# Patient Record
Sex: FEMALE | Race: BLACK OR AFRICAN AMERICAN | NOT HISPANIC OR LATINO | Employment: OTHER | URBAN - METROPOLITAN AREA
[De-identification: names, ages, dates, MRNs, and addresses within clinical notes are randomized per-mention and may not be internally consistent; named-entity substitution may affect disease eponyms.]

---

## 2023-01-25 PROBLEM — E78.5 HYPERLIPEMIA: Status: ACTIVE | Noted: 2023-01-25

## 2023-01-25 PROBLEM — F41.9 ANXIETY: Status: ACTIVE | Noted: 2023-01-25

## 2023-01-25 PROBLEM — M81.0 AGE RELATED OSTEOPOROSIS: Status: ACTIVE | Noted: 2023-01-25

## 2023-01-25 PROBLEM — K59.00 CONSTIPATION: Status: ACTIVE | Noted: 2023-01-25

## 2023-01-25 PROBLEM — I10 HYPERTENSION: Status: ACTIVE | Noted: 2023-01-25

## 2023-01-25 PROBLEM — J45.909 ASTHMA (HHS-HCC): Status: ACTIVE | Noted: 2023-01-25

## 2023-01-25 PROBLEM — M72.2 PLANTAR FASCIITIS: Status: ACTIVE | Noted: 2023-01-25

## 2023-01-25 PROBLEM — D50.9 MICROCYTIC ANEMIA: Status: ACTIVE | Noted: 2023-01-25

## 2023-01-25 PROBLEM — E87.1 HYPONATREMIA: Status: ACTIVE | Noted: 2023-01-25

## 2023-01-25 PROBLEM — J30.2 SEASONAL ALLERGIES: Status: ACTIVE | Noted: 2023-01-25

## 2023-01-25 PROBLEM — E79.0 ASYMPTOMATIC HYPERURICEMIA: Status: ACTIVE | Noted: 2023-01-25

## 2023-01-25 PROBLEM — J30.9 ALLERGIC RHINITIS: Status: ACTIVE | Noted: 2023-01-25

## 2023-01-25 PROBLEM — H40.9 GLAUCOMA: Status: ACTIVE | Noted: 2023-01-25

## 2023-01-25 PROBLEM — G25.0 BENIGN FAMILIAL TREMOR: Status: ACTIVE | Noted: 2023-01-25

## 2023-01-25 PROBLEM — R73.03 PRE-DIABETES: Status: ACTIVE | Noted: 2023-01-25

## 2023-01-25 PROBLEM — M17.12 PRIMARY OSTEOARTHRITIS OF LEFT KNEE: Status: ACTIVE | Noted: 2023-01-25

## 2023-01-25 PROBLEM — E55.9 VITAMIN D DEFICIENCY: Status: ACTIVE | Noted: 2023-01-25

## 2023-01-25 PROBLEM — K21.9 GERD (GASTROESOPHAGEAL REFLUX DISEASE): Status: ACTIVE | Noted: 2023-01-25

## 2023-01-25 RX ORDER — SODIUM CHLORIDE 1000 MG
TABLET, SOLUBLE MISCELLANEOUS
COMMUNITY
Start: 2022-07-15 | End: 2023-03-09 | Stop reason: SDUPTHER

## 2023-01-25 RX ORDER — PROPRANOLOL HYDROCHLORIDE 60 MG/1
CAPSULE, EXTENDED RELEASE ORAL
COMMUNITY
Start: 2022-07-15 | End: 2023-03-09 | Stop reason: SDUPTHER

## 2023-01-25 RX ORDER — PRIMIDONE 250 MG/1
TABLET ORAL
COMMUNITY
Start: 2015-06-09 | End: 2024-05-24 | Stop reason: SDUPTHER

## 2023-01-25 RX ORDER — TOPIRAMATE 50 MG/1
TABLET, FILM COATED ORAL
COMMUNITY
Start: 2017-08-24 | End: 2023-03-09 | Stop reason: SDUPTHER

## 2023-01-25 RX ORDER — FERROUS SULFATE 325(65) MG
TABLET ORAL
COMMUNITY
Start: 2018-02-06 | End: 2024-01-15 | Stop reason: WASHOUT

## 2023-01-25 RX ORDER — FOLIC ACID 1 MG/1
TABLET ORAL
COMMUNITY
End: 2023-12-14 | Stop reason: SDUPTHER

## 2023-01-25 RX ORDER — ALBUTEROL SULFATE 0.83 MG/ML
SOLUTION RESPIRATORY (INHALATION)
COMMUNITY
Start: 2019-11-20

## 2023-01-25 RX ORDER — LATANOPROST 50 UG/ML
SOLUTION/ DROPS OPHTHALMIC
COMMUNITY

## 2023-01-25 RX ORDER — VALSARTAN 320 MG/1
TABLET ORAL
COMMUNITY
Start: 2019-06-19 | End: 2023-03-09 | Stop reason: SDUPTHER

## 2023-01-25 RX ORDER — OMEPRAZOLE 40 MG/1
CAPSULE, DELAYED RELEASE ORAL
COMMUNITY
Start: 2016-02-19 | End: 2023-03-09 | Stop reason: SDUPTHER

## 2023-01-25 RX ORDER — NIFEDIPINE 90 MG/1
TABLET, EXTENDED RELEASE ORAL
COMMUNITY
Start: 2017-07-13 | End: 2023-03-09 | Stop reason: SDUPTHER

## 2023-01-25 RX ORDER — CALCIUM CARBONATE/VITAMIN D3 500-10/5ML
LIQUID (ML) ORAL
COMMUNITY
Start: 2016-11-16

## 2023-01-25 RX ORDER — FLUTICASONE PROPIONATE 110 UG/1
AEROSOL, METERED RESPIRATORY (INHALATION)
COMMUNITY
Start: 2012-01-09

## 2023-01-25 RX ORDER — PRAVASTATIN SODIUM 20 MG/1
TABLET ORAL
COMMUNITY
Start: 2022-03-13 | End: 2023-03-09 | Stop reason: SDUPTHER

## 2023-01-25 RX ORDER — ACETAMINOPHEN 500 MG
TABLET ORAL
COMMUNITY
Start: 2017-02-23

## 2023-03-09 ENCOUNTER — OFFICE VISIT (OUTPATIENT)
Dept: PRIMARY CARE | Facility: CLINIC | Age: 83
End: 2023-03-09
Payer: MEDICARE

## 2023-03-09 VITALS
SYSTOLIC BLOOD PRESSURE: 150 MMHG | OXYGEN SATURATION: 96 % | WEIGHT: 148 LBS | HEART RATE: 90 BPM | BODY MASS INDEX: 29.84 KG/M2 | HEIGHT: 59 IN | DIASTOLIC BLOOD PRESSURE: 77 MMHG

## 2023-03-09 DIAGNOSIS — R73.03 PRE-DIABETES: ICD-10-CM

## 2023-03-09 DIAGNOSIS — E78.00 PURE HYPERCHOLESTEROLEMIA: ICD-10-CM

## 2023-03-09 DIAGNOSIS — E87.1 HYPONATREMIA: ICD-10-CM

## 2023-03-09 DIAGNOSIS — M81.0 AGE-RELATED OSTEOPOROSIS WITHOUT CURRENT PATHOLOGICAL FRACTURE: ICD-10-CM

## 2023-03-09 DIAGNOSIS — I10 PRIMARY HYPERTENSION: ICD-10-CM

## 2023-03-09 DIAGNOSIS — G25.0 BENIGN FAMILIAL TREMOR: Primary | ICD-10-CM

## 2023-03-09 DIAGNOSIS — K21.9 GASTROESOPHAGEAL REFLUX DISEASE WITHOUT ESOPHAGITIS: ICD-10-CM

## 2023-03-09 DIAGNOSIS — E55.9 VITAMIN D DEFICIENCY: ICD-10-CM

## 2023-03-09 DIAGNOSIS — J45.20 MILD INTERMITTENT ASTHMA, UNSPECIFIED WHETHER COMPLICATED (HHS-HCC): ICD-10-CM

## 2023-03-09 PROCEDURE — 99214 OFFICE O/P EST MOD 30 MIN: CPT | Performed by: FAMILY MEDICINE

## 2023-03-09 PROCEDURE — 3077F SYST BP >= 140 MM HG: CPT | Performed by: FAMILY MEDICINE

## 2023-03-09 PROCEDURE — 1160F RVW MEDS BY RX/DR IN RCRD: CPT | Performed by: FAMILY MEDICINE

## 2023-03-09 PROCEDURE — 1036F TOBACCO NON-USER: CPT | Performed by: FAMILY MEDICINE

## 2023-03-09 PROCEDURE — 1159F MED LIST DOCD IN RCRD: CPT | Performed by: FAMILY MEDICINE

## 2023-03-09 PROCEDURE — 3078F DIAST BP <80 MM HG: CPT | Performed by: FAMILY MEDICINE

## 2023-03-09 RX ORDER — NIFEDIPINE 90 MG/1
90 TABLET, EXTENDED RELEASE ORAL DAILY
Qty: 90 TABLET | Refills: 1 | Status: SHIPPED | OUTPATIENT
Start: 2023-03-09 | End: 2023-06-07

## 2023-03-09 RX ORDER — PROPRANOLOL HYDROCHLORIDE 60 MG/1
60 CAPSULE, EXTENDED RELEASE ORAL DAILY
Qty: 90 CAPSULE | Refills: 1 | Status: SHIPPED | OUTPATIENT
Start: 2023-03-09 | End: 2023-05-23

## 2023-03-09 RX ORDER — OMEPRAZOLE 40 MG/1
40 CAPSULE, DELAYED RELEASE ORAL
Qty: 90 CAPSULE | Refills: 1 | Status: SHIPPED | OUTPATIENT
Start: 2023-03-09 | End: 2023-04-19

## 2023-03-09 RX ORDER — SODIUM CHLORIDE 1000 MG
500 TABLET, SOLUBLE MISCELLANEOUS DAILY
Qty: 45 TABLET | Refills: 3 | Status: SHIPPED | OUTPATIENT
Start: 2023-03-09 | End: 2024-01-10 | Stop reason: SDUPTHER

## 2023-03-09 RX ORDER — PRAVASTATIN SODIUM 20 MG/1
20 TABLET ORAL DAILY
Qty: 90 TABLET | Refills: 1 | Status: SHIPPED | OUTPATIENT
Start: 2023-03-09 | End: 2023-06-07

## 2023-03-09 RX ORDER — TOPIRAMATE 50 MG/1
50 TABLET, FILM COATED ORAL 2 TIMES DAILY
Qty: 180 TABLET | Refills: 1 | Status: SHIPPED | OUTPATIENT
Start: 2023-03-09 | End: 2024-01-10 | Stop reason: SDUPTHER

## 2023-03-09 RX ORDER — VALSARTAN 320 MG/1
320 TABLET ORAL DAILY
Qty: 90 TABLET | Refills: 1 | Status: SHIPPED | OUTPATIENT
Start: 2023-03-09 | End: 2023-04-19

## 2023-03-09 ASSESSMENT — ENCOUNTER SYMPTOMS
OCCASIONAL FEELINGS OF UNSTEADINESS: 0
DEPRESSION: 0
LOSS OF SENSATION IN FEET: 0

## 2023-03-09 ASSESSMENT — PATIENT HEALTH QUESTIONNAIRE - PHQ9
1. LITTLE INTEREST OR PLEASURE IN DOING THINGS: NOT AT ALL
SUM OF ALL RESPONSES TO PHQ9 QUESTIONS 1 AND 2: 0
2. FEELING DOWN, DEPRESSED OR HOPELESS: NOT AT ALL

## 2023-03-09 NOTE — PROGRESS NOTES
"Subjective   Patient ID: Nava Gutierrez is a 82 y.o. female who presents for a follow up for hypertension.    HPI   The patient's blood pressures are well- controlled on the current dose of nifedipine and valsartan. She is also taking iron supplements for iron deficiency anemia, omeprazole for GERD, Folic acid for her memory concerns, Primidone, propanolol and Topamax for benign familial tremors and sodium supplements for hyponatremia. The patient is taking these medications as prescribed and is tolerating it well. For her asthma she uses Flovent and albuterol as needed. She complains of significant back pain.    Review of Systems  Constitutional: No fever or chills  Cardiovascular: no chest pain, no palpitations and no syncope.   Respiratory: no cough, no shortness of breath during exertion and no shortness of breath at rest.   Gastrointestinal: no abdominal pain, no nausea and no vomiting.  Neuro: No Headache, no dizziness  MSK: + back pain.    Objective   /74 (BP Location: Left arm, Patient Position: Sitting, BP Cuff Size: Adult)   Pulse 90   Ht 1.499 m (4' 11\")   Wt 67.1 kg (148 lb)   SpO2 96%   BMI 29.89 kg/m²     Physical Exam  Constitutional: Alert and in no acute distress. Well developed, well nourished  Head and Face: Head and face: Normal.    Cardiovascular: Heart rate and rhythm were normal, normal S1 and S2. No peripheral edema.   Pulmonary: No respiratory distress. Clear bilateral breath sounds.  Musculoskeletal: Gait and station: Normal. Muscle strength/tone: Normal.   Skin: Normal skin color and pigmentation, normal skin turgor, and no rash.    Psychiatric: Judgment and insight: Intact. Mood and affect: Normal.    Assessment/Plan   Diagnoses and all orders for this visit:  Benign familial tremor  -     propranolol LA (Inderal LA) 60 mg 24 hr capsule; Take 1 capsule (60 mg) by mouth once daily.  -     topiramate (Topamax) 50 mg tablet; Take 1 tablet (50 mg) by mouth in the morning and 1 " tablet (50 mg) before bedtime.  -     CBC; Future  Mild intermittent asthma, unspecified whether complicated  Primary hypertension  -     NIFEdipine XL (Procardia XL) 90 mg 24 hr tablet; Take 1 tablet (90 mg) by mouth once daily. Do not crush, chew, or split.  -     valsartan (Diovan) 320 mg tablet; Take 1 tablet (320 mg) by mouth once daily.  -     Comprehensive Metabolic Panel; Future  -     Follow Up In [facility] - PCP; Future  Gastroesophageal reflux disease without esophagitis  -     omeprazole (PriLOSEC) 40 mg DR capsule; Take 1 capsule (40 mg) by mouth once daily in the morning. Take before meals.  Age-related osteoporosis without current pathological fracture  Pure hypercholesterolemia  -     pravastatin (Pravachol) 20 mg tablet; Take 1 tablet (20 mg) by mouth once daily.  -     Lipid Panel; Future  -     Vitamin D, Total; Future  Hyponatremia  -     sodium chloride 1,000 mg tablet; Take 0.5 tablets (0.5 g) by mouth once daily.  Pre-diabetes  -     Hemoglobin A1C; Future  -     TSH with reflex to Free T4 if abnormal; Future  -     Vitamin B12; Future  Vitamin D deficiency  -     Vitamin D, Total; Future        Dear Nava Gutierrez     It was my pleasure to take care of you today in the office. Below are the things we discussed today:    1. Hypertension: Continue Nifedipine and Valsartan.    2. Hyperlipidemia: Continue Pravastatin.    3. GERD: Continue omeprazole.    4. Hyponatremia: Continue Sodium supplements.    5. Asthma: Continue Flovent and use albuterol as needed.    6. Iron deficiency anemia: Continue Iron supplements.     7. Benign familial tremor: Continue Topamax, Primidone and Propanolol.     8. Memory concerns: Continue Folic acid.    Your yearly Physical is due in: July 2023  When you call the office for your yearly Physical, please ask them to inform me to order your blood work, so that you can get the fasting blood work before your appointment and we can discuss the results at your  physical.      Please call me if any questions arise from now until your next visit. I will call you after I am done seeing patients. A Doctor is always available by phone when the office is closed. Please feel free to call for help with any problem that you feel shouldn't wait until the office re-opens.     Scribe Attestation  By signing my name below, I, Jaye Mahan , Edy   attest that this documentation has been prepared under the direction and in the presence of Terra Gray MD.

## 2023-04-18 DIAGNOSIS — K21.9 GASTROESOPHAGEAL REFLUX DISEASE WITHOUT ESOPHAGITIS: ICD-10-CM

## 2023-04-18 DIAGNOSIS — I10 PRIMARY HYPERTENSION: ICD-10-CM

## 2023-04-19 RX ORDER — VALSARTAN 320 MG/1
TABLET ORAL
Qty: 100 TABLET | Refills: 0 | Status: SHIPPED | OUTPATIENT
Start: 2023-04-19 | End: 2023-07-10 | Stop reason: SDUPTHER

## 2023-04-19 RX ORDER — OMEPRAZOLE 40 MG/1
CAPSULE, DELAYED RELEASE ORAL
Qty: 100 CAPSULE | Refills: 0 | Status: SHIPPED | OUTPATIENT
Start: 2023-04-19 | End: 2023-07-10 | Stop reason: SDUPTHER

## 2023-05-23 DIAGNOSIS — G25.0 BENIGN FAMILIAL TREMOR: ICD-10-CM

## 2023-05-23 RX ORDER — PROPRANOLOL HYDROCHLORIDE 60 MG/1
60 CAPSULE, EXTENDED RELEASE ORAL DAILY
Qty: 100 CAPSULE | Refills: 0 | Status: SHIPPED | OUTPATIENT
Start: 2023-05-23 | End: 2023-07-10 | Stop reason: SDUPTHER

## 2023-06-07 DIAGNOSIS — E78.00 PURE HYPERCHOLESTEROLEMIA: ICD-10-CM

## 2023-06-07 DIAGNOSIS — I10 PRIMARY HYPERTENSION: ICD-10-CM

## 2023-06-07 RX ORDER — NIFEDIPINE 90 MG/1
TABLET, EXTENDED RELEASE ORAL
Qty: 100 TABLET | Refills: 0 | Status: SHIPPED | OUTPATIENT
Start: 2023-06-07 | End: 2023-07-10 | Stop reason: SDUPTHER

## 2023-06-07 RX ORDER — PRAVASTATIN SODIUM 20 MG/1
TABLET ORAL
Qty: 100 TABLET | Refills: 0 | Status: SHIPPED | OUTPATIENT
Start: 2023-06-07 | End: 2023-07-10 | Stop reason: SDUPTHER

## 2023-06-09 ENCOUNTER — LAB (OUTPATIENT)
Dept: LAB | Facility: LAB | Age: 83
End: 2023-06-09
Payer: MEDICARE

## 2023-06-09 DIAGNOSIS — I10 PRIMARY HYPERTENSION: ICD-10-CM

## 2023-06-09 DIAGNOSIS — E55.9 VITAMIN D DEFICIENCY: ICD-10-CM

## 2023-06-09 DIAGNOSIS — E78.00 PURE HYPERCHOLESTEROLEMIA: ICD-10-CM

## 2023-06-09 DIAGNOSIS — G25.0 BENIGN FAMILIAL TREMOR: ICD-10-CM

## 2023-06-09 DIAGNOSIS — R73.03 PRE-DIABETES: ICD-10-CM

## 2023-06-09 LAB
ALANINE AMINOTRANSFERASE (SGPT) (U/L) IN SER/PLAS: 10 U/L (ref 7–45)
ALBUMIN (G/DL) IN SER/PLAS: 4.4 G/DL (ref 3.4–5)
ALKALINE PHOSPHATASE (U/L) IN SER/PLAS: 89 U/L (ref 33–136)
ANION GAP IN SER/PLAS: 12 MMOL/L (ref 10–20)
ASPARTATE AMINOTRANSFERASE (SGOT) (U/L) IN SER/PLAS: 15 U/L (ref 9–39)
BILIRUBIN TOTAL (MG/DL) IN SER/PLAS: 0.2 MG/DL (ref 0–1.2)
CALCIDIOL (25 OH VITAMIN D3) (NG/ML) IN SER/PLAS: 36 NG/ML
CALCIUM (MG/DL) IN SER/PLAS: 9.6 MG/DL (ref 8.6–10.3)
CARBON DIOXIDE, TOTAL (MMOL/L) IN SER/PLAS: 29 MMOL/L (ref 21–32)
CHLORIDE (MMOL/L) IN SER/PLAS: 104 MMOL/L (ref 98–107)
COBALAMIN (VITAMIN B12) (PG/ML) IN SER/PLAS: 527 PG/ML (ref 211–911)
CREATININE (MG/DL) IN SER/PLAS: 0.87 MG/DL (ref 0.5–1.05)
ERYTHROCYTE DISTRIBUTION WIDTH (RATIO) BY AUTOMATED COUNT: 16.4 % (ref 11.5–14.5)
ERYTHROCYTE MEAN CORPUSCULAR HEMOGLOBIN CONCENTRATION (G/DL) BY AUTOMATED: 29.2 G/DL (ref 32–36)
ERYTHROCYTE MEAN CORPUSCULAR VOLUME (FL) BY AUTOMATED COUNT: 70 FL (ref 80–100)
ERYTHROCYTES (10*6/UL) IN BLOOD BY AUTOMATED COUNT: 4.62 X10E12/L (ref 4–5.2)
ESTIMATED AVERAGE GLUCOSE FOR HBA1C: 123 MG/DL
GFR FEMALE: 67 ML/MIN/1.73M2
GLUCOSE (MG/DL) IN SER/PLAS: 103 MG/DL (ref 74–99)
HEMATOCRIT (%) IN BLOOD BY AUTOMATED COUNT: 32.2 % (ref 36–46)
HEMOGLOBIN (G/DL) IN BLOOD: 9.4 G/DL (ref 12–16)
HEMOGLOBIN A1C/HEMOGLOBIN TOTAL IN BLOOD: 5.9 %
LEUKOCYTES (10*3/UL) IN BLOOD BY AUTOMATED COUNT: 5.9 X10E9/L (ref 4.4–11.3)
PLATELETS (10*3/UL) IN BLOOD AUTOMATED COUNT: 254 X10E9/L (ref 150–450)
POTASSIUM (MMOL/L) IN SER/PLAS: 4.9 MMOL/L (ref 3.5–5.3)
PROTEIN TOTAL: 7.6 G/DL (ref 6.4–8.2)
SODIUM (MMOL/L) IN SER/PLAS: 140 MMOL/L (ref 136–145)
THYROTROPIN (MIU/L) IN SER/PLAS BY DETECTION LIMIT <= 0.05 MIU/L: 3.37 MIU/L (ref 0.44–3.98)
UREA NITROGEN (MG/DL) IN SER/PLAS: 21 MG/DL (ref 6–23)

## 2023-06-09 PROCEDURE — 36415 COLL VENOUS BLD VENIPUNCTURE: CPT

## 2023-06-09 PROCEDURE — 82306 VITAMIN D 25 HYDROXY: CPT

## 2023-06-09 PROCEDURE — 83036 HEMOGLOBIN GLYCOSYLATED A1C: CPT

## 2023-06-09 PROCEDURE — 80053 COMPREHEN METABOLIC PANEL: CPT

## 2023-06-09 PROCEDURE — 84443 ASSAY THYROID STIM HORMONE: CPT

## 2023-06-09 PROCEDURE — 82607 VITAMIN B-12: CPT

## 2023-06-09 PROCEDURE — 85027 COMPLETE CBC AUTOMATED: CPT

## 2023-06-15 LAB
CHOLESTEROL (MG/DL) IN SER/PLAS: 184 MG/DL (ref 0–199)
CHOLESTEROL IN HDL (MG/DL) IN SER/PLAS: 47.9 MG/DL
CHOLESTEROL/HDL RATIO: 3.8
LDL: 96 MG/DL (ref 0–99)
TRIGLYCERIDE (MG/DL) IN SER/PLAS: 199 MG/DL (ref 0–149)
VLDL: 40 MG/DL (ref 0–40)

## 2023-07-10 ENCOUNTER — OFFICE VISIT (OUTPATIENT)
Dept: PRIMARY CARE | Facility: CLINIC | Age: 83
End: 2023-07-10
Payer: MEDICARE

## 2023-07-10 VITALS
OXYGEN SATURATION: 95 % | DIASTOLIC BLOOD PRESSURE: 62 MMHG | SYSTOLIC BLOOD PRESSURE: 138 MMHG | WEIGHT: 148 LBS | HEIGHT: 59 IN | HEART RATE: 70 BPM | BODY MASS INDEX: 29.84 KG/M2

## 2023-07-10 DIAGNOSIS — Z12.31 ENCOUNTER FOR SCREENING MAMMOGRAM FOR BREAST CANCER: ICD-10-CM

## 2023-07-10 DIAGNOSIS — G25.0 BENIGN FAMILIAL TREMOR: ICD-10-CM

## 2023-07-10 DIAGNOSIS — Z78.0 ASYMPTOMATIC MENOPAUSAL STATE: ICD-10-CM

## 2023-07-10 DIAGNOSIS — I10 PRIMARY HYPERTENSION: ICD-10-CM

## 2023-07-10 DIAGNOSIS — K21.9 GASTROESOPHAGEAL REFLUX DISEASE WITHOUT ESOPHAGITIS: ICD-10-CM

## 2023-07-10 DIAGNOSIS — Z00.00 ROUTINE GENERAL MEDICAL EXAMINATION AT HEALTH CARE FACILITY: Primary | ICD-10-CM

## 2023-07-10 DIAGNOSIS — E78.00 PURE HYPERCHOLESTEROLEMIA: ICD-10-CM

## 2023-07-10 PROCEDURE — 3078F DIAST BP <80 MM HG: CPT | Performed by: FAMILY MEDICINE

## 2023-07-10 PROCEDURE — 1036F TOBACCO NON-USER: CPT | Performed by: FAMILY MEDICINE

## 2023-07-10 PROCEDURE — 99397 PER PM REEVAL EST PAT 65+ YR: CPT | Performed by: FAMILY MEDICINE

## 2023-07-10 PROCEDURE — 1126F AMNT PAIN NOTED NONE PRSNT: CPT | Performed by: FAMILY MEDICINE

## 2023-07-10 PROCEDURE — 1170F FXNL STATUS ASSESSED: CPT | Performed by: FAMILY MEDICINE

## 2023-07-10 PROCEDURE — G0439 PPPS, SUBSEQ VISIT: HCPCS | Performed by: FAMILY MEDICINE

## 2023-07-10 PROCEDURE — 3075F SYST BP GE 130 - 139MM HG: CPT | Performed by: FAMILY MEDICINE

## 2023-07-10 PROCEDURE — 99214 OFFICE O/P EST MOD 30 MIN: CPT | Performed by: FAMILY MEDICINE

## 2023-07-10 PROCEDURE — 1159F MED LIST DOCD IN RCRD: CPT | Performed by: FAMILY MEDICINE

## 2023-07-10 PROCEDURE — 1160F RVW MEDS BY RX/DR IN RCRD: CPT | Performed by: FAMILY MEDICINE

## 2023-07-10 RX ORDER — PRAVASTATIN SODIUM 20 MG/1
20 TABLET ORAL DAILY
Qty: 90 TABLET | Refills: 1 | Status: SHIPPED | OUTPATIENT
Start: 2023-07-10 | End: 2023-12-11

## 2023-07-10 RX ORDER — VALSARTAN 320 MG/1
320 TABLET ORAL DAILY
Qty: 90 TABLET | Refills: 1 | Status: SHIPPED | OUTPATIENT
Start: 2023-07-10 | End: 2023-12-10

## 2023-07-10 RX ORDER — PROPRANOLOL HYDROCHLORIDE 60 MG/1
60 CAPSULE, EXTENDED RELEASE ORAL DAILY
Qty: 90 CAPSULE | Refills: 1 | Status: SHIPPED | OUTPATIENT
Start: 2023-07-10 | End: 2023-12-11

## 2023-07-10 RX ORDER — OMEPRAZOLE 40 MG/1
CAPSULE, DELAYED RELEASE ORAL
Qty: 90 CAPSULE | Refills: 1 | Status: SHIPPED | OUTPATIENT
Start: 2023-07-10 | End: 2023-12-10

## 2023-07-10 RX ORDER — NIFEDIPINE 90 MG/1
90 TABLET, EXTENDED RELEASE ORAL DAILY
Qty: 90 TABLET | Refills: 1 | Status: SHIPPED | OUTPATIENT
Start: 2023-07-10 | End: 2023-12-11

## 2023-07-10 ASSESSMENT — ACTIVITIES OF DAILY LIVING (ADL)
GROCERY SHOPPING: INDEPENDENT
DRESSING: INDEPENDENT
ADEQUATE_TO_COMPLETE_ADL: YES
DOING HOUSEWORK: INDEPENDENT
PILL BOX USED: NO
USING TRANSPORTATION: NEEDS ASSISTANCE
JUDGMENT_ADEQUATE_SAFELY_COMPLETE_DAILY_ACTIVITIES: YES
NEEDS ASSISTANCE WITH FOOD: INDEPENDENT
PREPARING MEALS: INDEPENDENT
EATING: INDEPENDENT
USING TELEPHONE: INDEPENDENT
FEEDING: INDEPENDENT
TOILETING: INDEPENDENT
BATHING: INDEPENDENT
STIL DRIVING: NO
TAKING MEDICATION: INDEPENDENT
MANAGING FINANCES: INDEPENDENT

## 2023-07-10 ASSESSMENT — PATIENT HEALTH QUESTIONNAIRE - PHQ9
1. LITTLE INTEREST OR PLEASURE IN DOING THINGS: NOT AT ALL
2. FEELING DOWN, DEPRESSED OR HOPELESS: NOT AT ALL
SUM OF ALL RESPONSES TO PHQ9 QUESTIONS 1 AND 2: 0

## 2023-07-10 ASSESSMENT — COLUMBIA-SUICIDE SEVERITY RATING SCALE - C-SSRS
2. HAVE YOU ACTUALLY HAD ANY THOUGHTS OF KILLING YOURSELF?: NO
1. IN THE PAST MONTH, HAVE YOU WISHED YOU WERE DEAD OR WISHED YOU COULD GO TO SLEEP AND NOT WAKE UP?: NO
6. HAVE YOU EVER DONE ANYTHING, STARTED TO DO ANYTHING, OR PREPARED TO DO ANYTHING TO END YOUR LIFE?: NO

## 2023-07-10 ASSESSMENT — ENCOUNTER SYMPTOMS
LOSS OF SENSATION IN FEET: 0
OCCASIONAL FEELINGS OF UNSTEADINESS: 0
DEPRESSION: 0

## 2023-07-10 NOTE — PROGRESS NOTES
"Subjective   Patient ID: Nava Gutierrez is a 82 y.o. female who presents for Medicare Annual Wellness Visit Subsequent.    HPI   The patient reports that she is taking nifedipine and Valsartan for her hypertension, omeprazole for GERD, pravastatin for her hyperlipidemia and Flovent and Albuterol as needed for her asthma. She is taking these medications as prescribed and has no side effects from them. Her tremors are being managed by Neurology and she is currently taking Primidone, Topamax and Propanolol as prescribed. The patient is also taking folic acid for her iron deficiency anemia and is following up with Hematology as scheduled. She mentions that she experienced an episode of vertigo and thinks it is related to her not hydrating enough when the temperatures are high.     Review of Systems  Constitutional: No fever or chills, No Night Sweats  Eyes: No Blurry Vision or Eye sight problems  ENT: No Nasal Discharge, Hoarseness, sore throat  Cardiovascular: no chest pain, no palpitations and no syncope.   Respiratory: no cough, no shortness of breath during exertion and no shortness of breath at rest.   Gastrointestinal: no abdominal pain, no nausea and no vomiting.   : No vaginal discharge, burning with urination, no blood in urine or stools  Skin: No Skin rashes or Lesions  Neuro: No Headache, no dizziness or Numbness or tingling  Psych: No Anxiety, depression or sleeping problems  Heme: No Easy bleeding or brusing.     Objective   /62   Pulse 70   Ht 1.499 m (4' 11\")   Wt 67.1 kg (148 lb)   SpO2 95%   BMI 29.89 kg/m²     Physical Exam  Patient declined Chaperone  Constitutional: Alert and in no acute distress. Well developed, well nourished.   Head and Face: Head and face: Normal.    Eyes: Normal external exam. Pupils were equal in size, round, reactive to light (PERRL) with normal accommodation and extraocular movements intact (EOMI).   Ears, Nose, Mouth, and Throat: External inspection of ears and " nose: Normal.  Hearing: Normal.  Nasal mucosa, septum, and turbinates: Normal.  Lips, teeth, and gums: Normal.  Oropharynx: Normal.   Neck: No neck mass was observed. Supple. Thyroid not enlarged and there were no palpable thyroid nodules.   Cardiovascular: Heart rate and rhythm were normal, normal S1 and S2. Pedal pulses: Normal. No peripheral edema.   Pulmonary: No respiratory distress. Clear bilateral breath sounds.   Breast: Normal Appearance, No Masses or lumps palpated  Abdomen: Soft nontender; no abdominal mass palpated. Normal bowel sounds. No organomegaly.   Musculoskeletal: No joint swelling seen, normal movements of all extremities. Range of motion: Normal.  Muscle strength/tone: Normal.    Skin: Normal skin color and pigmentation, normal skin turgor, and no rash.   Neurologic: Deep tendon reflexes were 2+ and symmetric.   Psychiatric: Judgment and insight: Intact. Mood and affect: Normal.  Lymphatic: No cervical lymphadenopathy. Palpation of lymph nodes in axillae: Normal.  Palpation of lymph nodes in groin: Normal.    Lab Results   Component Value Date    WBC 5.9 06/09/2023    HGB 9.4 (L) 06/09/2023    HCT 32.2 (L) 06/09/2023     06/09/2023    CHOL 184 06/15/2023    TRIG 199 (H) 06/15/2023    HDL 47.9 06/15/2023    ALT 10 06/09/2023    AST 15 06/09/2023     06/09/2023    K 4.9 06/09/2023     06/09/2023    CREATININE 0.87 06/09/2023    BUN 21 06/09/2023    CO2 29 06/09/2023    TSH 3.37 06/09/2023    INR 1.1 01/31/2018    HGBA1C 5.9 (A) 06/09/2023       Electrocardiogram 12 Lead  Normal sinus rhythm with sinus arrhythmia  Voltage criteria for left ventricular hypertrophy  T wave abnormality, consider inferior ischemia  Abnormal ECG  No previous ECGs available  Confirmed by JAIRON LOPES MD ([id number]) on 2/6/2018 2:55:34 PM      Assessment/Plan   Diagnoses and all orders for this visit:  Routine general medical examination at health care facility  Asymptomatic menopausal state  -     XR  DEXA bone density; Future  Encounter for screening mammogram for breast cancer  -     BI mammo bilateral screening tomosynthesis; Future  Primary hypertension  -     valsartan (Diovan) 320 mg tablet; Take 1 tablet (320 mg) by mouth once daily.  -     NIFEdipine XL (Procardia XL) 90 mg 24 hr tablet; Take 1 tablet (90 mg) by mouth once daily. DO NOT CRUSH CHEW OR SPLIT  -     CBC; Future  -     Comprehensive Metabolic Panel; Future  -     Follow Up In [facility] - PCP - Established; Future  Pure hypercholesterolemia  -     pravastatin (Pravachol) 20 mg tablet; Take 1 tablet (20 mg) by mouth once daily.  Gastroesophageal reflux disease without esophagitis  -     omeprazole (PriLOSEC) 40 mg DR capsule; TAKE 1 CAPSULE BY MOUTH DAILY IN THE MORNING BEFORE A MEAL  Benign familial tremor  -     propranolol LA (Inderal LA) 60 mg 24 hr capsule; Take 1 capsule (60 mg) by mouth once daily.        Dear Nava Gutierrez     It was my pleasure to take care of you today in the office. Below are the things we discussed today:    1. 1. Immunizations: Yearly Flu shot is recommended. Up-to-date          a: COVID: Up-to-Date         b: Tetanus: Please get from the pharmacy          c: Shingrix: Please get from the pharmacy          d: Pneumovax: Up-to-date         e: Prevnar: Up-to-date    2. Blood Work: Reviewed   3. Seen your dentist twice a year  4. Yearly Eye exam is recommended    5. BMI: Overweight   6: Diet recommendations:   Eat Clean, Try to have as many home cooked meals as possible  Avoid processed foods which contain excess calories, sugar, and sodium.    7. Exercise recommendations:   150 minutes a week to maintain your weight     If you have to loose weight, you need a better diet and exercise plan.     8. Supplements recommended:  a - Calcium 600 mg up to twice a day to get a total of 1200 mg. Each 8 oz of milk or yogurt or 1 oz of cheese, 1 Banana, 1 serving of green Leafy vegetable has about 300 mg of Calcium,  so you may subtract that amount. Calcium citrate is the only acceptable supplement to take if you take an acid suppressing medication like Prilosec; otherwise Calcium carbonate is acceptable too (It can cause Constipation).   b - Vitamin D - 2000 IU daily     9. Please get your Living will / Advance directive completed if you do not have one already. Please make sure our office has a copy of the latest one.     10. Colonoscopy: No more recommended   11. Mammogram: Uptodate, ordered for Dec 2023   12. PAP: Not indicated   13. DEXA: Ordered for Dec 2023   14: Skin Check: Please see Dermatology once a year for a Skin Check.     15. Hypertension: Continue nifedipine and valsartan.    16. GERD: Continue omeprazole.     17. Hyperlipidemia: Continue pravastatin.    18. anemia: Continue Folic acid. Continue following up with Hematology.     19.  Prediabetes: A1c showed 5.9. Advised the patient to work on consuming a healthier diet with less carbohydrates and sugars.     20. Asthma: Continue Flovent and Albuterol as needed.    21. Tremors: Continue Primidone, Topamax and Propanolol.    Follow up in 6 months.     Follow up in one year for a Physical. Please call the office before your Physical to see if you need blood work completed prior to your physical.     Please call me if any questions arise from now until your next visit. I will call you after I am done seeing patients. A Doctor is always available by phone when the office is closed. Please feel free to call for help with any problem that you feel shouldn't wait until the office re-opens.     Scribe Attestation  By signing my name below, IJaye Scribe   attest that this documentation has been prepared under the direction and in the presence of Terra Gray MD.

## 2023-07-13 ENCOUNTER — APPOINTMENT (OUTPATIENT)
Dept: PRIMARY CARE | Facility: CLINIC | Age: 83
End: 2023-07-13
Payer: MEDICARE

## 2023-09-22 DIAGNOSIS — K21.9 GASTROESOPHAGEAL REFLUX DISEASE WITHOUT ESOPHAGITIS: ICD-10-CM

## 2023-09-22 DIAGNOSIS — I10 PRIMARY HYPERTENSION: ICD-10-CM

## 2023-09-25 RX ORDER — OMEPRAZOLE 40 MG/1
CAPSULE, DELAYED RELEASE ORAL
Qty: 100 CAPSULE | Refills: 2 | OUTPATIENT
Start: 2023-09-25

## 2023-09-25 RX ORDER — VALSARTAN 320 MG/1
320 TABLET ORAL DAILY
Qty: 100 TABLET | Refills: 2 | OUTPATIENT
Start: 2023-09-25

## 2023-10-18 ENCOUNTER — OFFICE VISIT (OUTPATIENT)
Dept: PRIMARY CARE | Facility: CLINIC | Age: 83
End: 2023-10-18
Payer: MEDICARE

## 2023-10-18 VITALS
BODY MASS INDEX: 28.63 KG/M2 | OXYGEN SATURATION: 94 % | HEART RATE: 75 BPM | HEIGHT: 59 IN | DIASTOLIC BLOOD PRESSURE: 72 MMHG | SYSTOLIC BLOOD PRESSURE: 166 MMHG | WEIGHT: 142 LBS

## 2023-10-18 DIAGNOSIS — Z23 ENCOUNTER FOR IMMUNIZATION: ICD-10-CM

## 2023-10-18 DIAGNOSIS — B02.9 ACUTE PAIN ASSOCIATED WITH HERPES ZOSTER: Primary | ICD-10-CM

## 2023-10-18 PROCEDURE — 99213 OFFICE O/P EST LOW 20 MIN: CPT | Performed by: FAMILY MEDICINE

## 2023-10-18 PROCEDURE — G0008 ADMIN INFLUENZA VIRUS VAC: HCPCS | Performed by: FAMILY MEDICINE

## 2023-10-18 PROCEDURE — 3077F SYST BP >= 140 MM HG: CPT | Performed by: FAMILY MEDICINE

## 2023-10-18 PROCEDURE — 1126F AMNT PAIN NOTED NONE PRSNT: CPT | Performed by: FAMILY MEDICINE

## 2023-10-18 PROCEDURE — 1159F MED LIST DOCD IN RCRD: CPT | Performed by: FAMILY MEDICINE

## 2023-10-18 PROCEDURE — 1036F TOBACCO NON-USER: CPT | Performed by: FAMILY MEDICINE

## 2023-10-18 PROCEDURE — 90662 IIV NO PRSV INCREASED AG IM: CPT | Performed by: FAMILY MEDICINE

## 2023-10-18 PROCEDURE — 3078F DIAST BP <80 MM HG: CPT | Performed by: FAMILY MEDICINE

## 2023-10-18 PROCEDURE — 1160F RVW MEDS BY RX/DR IN RCRD: CPT | Performed by: FAMILY MEDICINE

## 2023-10-18 RX ORDER — GABAPENTIN 300 MG/1
300 CAPSULE ORAL NIGHTLY
Qty: 90 CAPSULE | Refills: 0 | Status: SHIPPED | OUTPATIENT
Start: 2023-10-18 | End: 2023-11-16 | Stop reason: SINTOL

## 2023-10-18 NOTE — PROGRESS NOTES
"Subjective   Patient ID: Nava Gutierrez is a 82 y.o. female who presents for Follow-up (Shingles/Pain).    HPI Patient got shingles 16 days ago. She was given valtrex and lidocaine cream. Finish Valtrex and now lidocaine cream is over. It didn't help with pain and she has pain at night that wakes her up from sleep    Review of Systems  Constitutional: No fever or chills  Cardiovascular: no chest pain, no palpitations and no syncope.   Respiratory: no cough, no shortness of breath during exertion and no shortness of breath at rest.   Gastrointestinal: no abdominal pain, no nausea and no vomiting.  Neuro: No Headache, no dizziness    Objective   /72   Pulse 75   Ht 1.499 m (4' 11\")   Wt 64.4 kg (142 lb)   SpO2 94%   BMI 28.68 kg/m²     Physical Exam  Constitutional: Alert and in no acute distress. Well developed, well nourished  Head and Face: Head and face: Normal.    Cardiovascular: Heart rate and rhythm were normal, normal S1 and S2. No peripheral edema.   Pulmonary: No respiratory distress. Clear bilateral breath sounds.  Musculoskeletal: Gait and station: Normal. Muscle strength/tone: Normal.   Skin: Normal skin color and pigmentation, normal skin turgor, and no rash.    Psychiatric: Judgment and insight: Intact. Mood and affect: Normal.    Lab Results   Component Value Date    WBC 5.9 07/13/2023    HGB 9.9 (L) 07/13/2023    HCT 33.1 (L) 07/13/2023     07/13/2023    CHOL 184 06/15/2023    TRIG 199 (H) 06/15/2023    HDL 47.9 06/15/2023    ALT 10 07/13/2023    AST 17 07/13/2023     07/13/2023    K 4.7 07/13/2023     07/13/2023    CREATININE 0.96 07/13/2023    BUN 21 07/13/2023    CO2 29 07/13/2023    TSH 3.37 06/09/2023    INR 1.1 01/31/2018    HGBA1C 5.9 (A) 06/09/2023       OUTSIDE IMAGING SCAN  Ordered by an unspecified provider.      Assessment/Plan   Diagnoses and all orders for this visit:  Acute pain associated with herpes zoster  -     gabapentin (Neurontin) 300 mg capsule; Take " 1 capsule (300 mg) by mouth once daily at bedtime.  Encounter for immunization  -     Flu vaccine, quadrivalent, high-dose, preservative free, age 65y+ (FLUZONE)        Dear Nava Gutierrez     It was my pleasure to take care of you today in the office. Below are the things we discussed today:    1. Post shingles pain - Start Gabapentin 300 mg at bedtime. If you have side effects let me know. If it does not make you drowsy you can use it during the day.       Follow up in 3 months    Your yearly Physical is due in: July 2024  When you call the office for your yearly Physical, please ask them to inform me to order your blood work, so that you can get the fasting blood work before your appointment and we can discuss the results at your physical.      Please call me if any questions arise from now until your next visit. I will call you after I am done seeing patients. A Doctor is always available by phone when the office is closed. Please feel free to call for help with any problem that you feel shouldn't wait until the office re-opens.     Terra Gray MD

## 2023-11-16 ENCOUNTER — OFFICE VISIT (OUTPATIENT)
Dept: PRIMARY CARE | Facility: CLINIC | Age: 83
End: 2023-11-16
Payer: MEDICARE

## 2023-11-16 VITALS
HEIGHT: 59 IN | BODY MASS INDEX: 28.22 KG/M2 | DIASTOLIC BLOOD PRESSURE: 78 MMHG | OXYGEN SATURATION: 97 % | HEART RATE: 76 BPM | SYSTOLIC BLOOD PRESSURE: 188 MMHG | WEIGHT: 140 LBS

## 2023-11-16 DIAGNOSIS — B02.9 ACUTE PAIN ASSOCIATED WITH HERPES ZOSTER: Primary | ICD-10-CM

## 2023-11-16 PROCEDURE — 3077F SYST BP >= 140 MM HG: CPT | Performed by: FAMILY MEDICINE

## 2023-11-16 PROCEDURE — 1036F TOBACCO NON-USER: CPT | Performed by: FAMILY MEDICINE

## 2023-11-16 PROCEDURE — 99213 OFFICE O/P EST LOW 20 MIN: CPT | Performed by: FAMILY MEDICINE

## 2023-11-16 PROCEDURE — 1160F RVW MEDS BY RX/DR IN RCRD: CPT | Performed by: FAMILY MEDICINE

## 2023-11-16 PROCEDURE — 3078F DIAST BP <80 MM HG: CPT | Performed by: FAMILY MEDICINE

## 2023-11-16 PROCEDURE — 1159F MED LIST DOCD IN RCRD: CPT | Performed by: FAMILY MEDICINE

## 2023-11-16 PROCEDURE — 1126F AMNT PAIN NOTED NONE PRSNT: CPT | Performed by: FAMILY MEDICINE

## 2023-11-16 RX ORDER — AMITRIPTYLINE HYDROCHLORIDE 10 MG/1
10 TABLET, FILM COATED ORAL NIGHTLY
Qty: 90 TABLET | Refills: 0 | Status: SHIPPED | OUTPATIENT
Start: 2023-11-16 | End: 2023-11-24 | Stop reason: ALTCHOICE

## 2023-11-16 NOTE — PATIENT INSTRUCTIONS
Start Amitriptyline 10 mg and if pain not better increase to 20 mg     Use Cerave or Cetaphil or Aquaphor or Eucerin on your back twice a day

## 2023-11-16 NOTE — PROGRESS NOTES
"Subjective   Patient ID: Nava Gutierrez is a 82 y.o. female who presents for Itching.    HPI   The patient reports that she is still experiencing post shingles pain in her left breast and left mid back area. She is no longer taking the gabapentin.     Review of Systems  Constitutional: No fever or chills  Cardiovascular: no chest pain, no palpitations and no syncope.   Respiratory: no cough, no shortness of breath during exertion and no shortness of breath at rest.   Gastrointestinal: no abdominal pain, no nausea and no vomiting.  Neuro: No Headache, no dizziness  Skin: + itching     Objective   BP (!) 188/78   Pulse 76   Ht 1.499 m (4' 11\")   Wt 63.5 kg (140 lb)   SpO2 97%   BMI 28.28 kg/m²     Physical Exam  Constitutional: Alert and in no acute distress. Well developed, well nourished  Head and Face: Head and face: Normal.    Cardiovascular: Heart rate and rhythm were normal, normal S1 and S2. No peripheral edema.   Pulmonary: No respiratory distress. Clear bilateral breath sounds.  Musculoskeletal: Gait and station: Normal. Muscle strength/tone: Normal.   Skin: Normal skin color and pigmentation, normal skin turgor, and no rash.    Psychiatric: Judgment and insight: Intact. Mood and affect: Normal.    Lab Results   Component Value Date    WBC 5.9 07/13/2023    HGB 9.9 (L) 07/13/2023    HCT 33.1 (L) 07/13/2023     07/13/2023    CHOL 184 06/15/2023    TRIG 199 (H) 06/15/2023    HDL 47.9 06/15/2023    ALT 10 07/13/2023    AST 17 07/13/2023     07/13/2023    K 4.7 07/13/2023     07/13/2023    CREATININE 0.96 07/13/2023    BUN 21 07/13/2023    CO2 29 07/13/2023    TSH 3.37 06/09/2023    INR 1.1 01/31/2018    HGBA1C 5.9 (A) 06/09/2023       OUTSIDE IMAGING SCAN  Ordered by an unspecified provider.      Assessment/Plan   Diagnoses and all orders for this visit:  Acute pain associated with herpes zoster  -     amitriptyline (Elavil) 10 mg tablet; Take 1 tablet (10 mg) by mouth once daily at " bedtime.        Dear Nava Gutierrez     It was my pleasure to take care of you today in the office. Below are the things we discussed today:    1. Postherpetic neuralgia and itching: Start 10 mg amitriptyline at night, advised the patient that if this dose does not control her pain increase dose to 20 mg. Use CeraVe, Aquaphor or Cetaphil cream on the area and avoid taking hot showers. Instead, use lukewarm water. If there is no improvement in 2-3 weeks please let me know.    Follow up in 2 months.     Your yearly Physical is due in: July 2024   When you call the office for your yearly Physical, please ask them to inform me to order your blood work, so that you can get the fasting blood work before your appointment and we can discuss the results at your physical.      Please call me if any questions arise from now until your next visit. I will call you after I am done seeing patients. A Doctor is always available by phone when the office is closed. Please feel free to call for help with any problem that you feel shouldn't wait until the office re-opens.     Scribe Attestation  By signing my name below, IJaye Scribe   attest that this documentation has been prepared under the direction and in the presence of Terra Gray MD.

## 2023-11-24 ENCOUNTER — HOSPITAL ENCOUNTER (EMERGENCY)
Facility: HOSPITAL | Age: 83
Discharge: HOME | End: 2023-11-24
Attending: EMERGENCY MEDICINE
Payer: MEDICARE

## 2023-11-24 ENCOUNTER — APPOINTMENT (OUTPATIENT)
Dept: RADIOLOGY | Facility: HOSPITAL | Age: 83
End: 2023-11-24
Payer: MEDICARE

## 2023-11-24 VITALS
RESPIRATION RATE: 18 BRPM | OXYGEN SATURATION: 98 % | DIASTOLIC BLOOD PRESSURE: 79 MMHG | HEIGHT: 59 IN | SYSTOLIC BLOOD PRESSURE: 181 MMHG | TEMPERATURE: 97.6 F | BODY MASS INDEX: 28.63 KG/M2 | HEART RATE: 86 BPM | WEIGHT: 142 LBS

## 2023-11-24 DIAGNOSIS — B02.29 POST HERPETIC NEURALGIA: Primary | ICD-10-CM

## 2023-11-24 LAB
ALBUMIN SERPL BCP-MCNC: 4.2 G/DL (ref 3.4–5)
ALP SERPL-CCNC: 63 U/L (ref 33–136)
ALT SERPL W P-5'-P-CCNC: 20 U/L (ref 7–45)
ANION GAP SERPL CALC-SCNC: 12 MMOL/L (ref 10–20)
AST SERPL W P-5'-P-CCNC: 23 U/L (ref 9–39)
BASOPHILS # BLD AUTO: 0.07 X10*3/UL (ref 0–0.1)
BASOPHILS NFR BLD AUTO: 1.4 %
BILIRUB SERPL-MCNC: 0.3 MG/DL (ref 0–1.2)
BUN SERPL-MCNC: 12 MG/DL (ref 6–23)
CALCIUM SERPL-MCNC: 9.1 MG/DL (ref 8.6–10.3)
CARDIAC TROPONIN I PNL SERPL HS: 3 NG/L (ref 0–13)
CHLORIDE SERPL-SCNC: 95 MMOL/L (ref 98–107)
CO2 SERPL-SCNC: 28 MMOL/L (ref 21–32)
CREAT SERPL-MCNC: 0.85 MG/DL (ref 0.5–1.05)
EOSINOPHIL # BLD AUTO: 0.21 X10*3/UL (ref 0–0.4)
EOSINOPHIL NFR BLD AUTO: 4.2 %
ERYTHROCYTE [DISTWIDTH] IN BLOOD BY AUTOMATED COUNT: 15.8 % (ref 11.5–14.5)
GFR SERPL CREATININE-BSD FRML MDRD: 69 ML/MIN/1.73M*2
GLUCOSE SERPL-MCNC: 104 MG/DL (ref 74–99)
HCT VFR BLD AUTO: 32 % (ref 36–46)
HGB BLD-MCNC: 9.7 G/DL (ref 12–16)
IMM GRANULOCYTES # BLD AUTO: 0.02 X10*3/UL (ref 0–0.5)
IMM GRANULOCYTES NFR BLD AUTO: 0.4 % (ref 0–0.9)
LYMPHOCYTES # BLD AUTO: 2.52 X10*3/UL (ref 0.8–3)
LYMPHOCYTES NFR BLD AUTO: 50.4 %
MCH RBC QN AUTO: 20.9 PG (ref 26–34)
MCHC RBC AUTO-ENTMCNC: 30.3 G/DL (ref 32–36)
MCV RBC AUTO: 69 FL (ref 80–100)
MONOCYTES # BLD AUTO: 0.52 X10*3/UL (ref 0.05–0.8)
MONOCYTES NFR BLD AUTO: 10.4 %
NEUTROPHILS # BLD AUTO: 1.66 X10*3/UL (ref 1.6–5.5)
NEUTROPHILS NFR BLD AUTO: 33.2 %
NRBC BLD-RTO: 0 /100 WBCS (ref 0–0)
PLATELET # BLD AUTO: 315 X10*3/UL (ref 150–450)
POTASSIUM SERPL-SCNC: 4.9 MMOL/L (ref 3.5–5.3)
PROT SERPL-MCNC: 7.8 G/DL (ref 6.4–8.2)
RBC # BLD AUTO: 4.65 X10*6/UL (ref 4–5.2)
SODIUM SERPL-SCNC: 130 MMOL/L (ref 136–145)
WBC # BLD AUTO: 5 X10*3/UL (ref 4.4–11.3)

## 2023-11-24 PROCEDURE — 99285 EMERGENCY DEPT VISIT HI MDM: CPT | Performed by: EMERGENCY MEDICINE

## 2023-11-24 PROCEDURE — 71046 X-RAY EXAM CHEST 2 VIEWS: CPT | Mod: FY

## 2023-11-24 PROCEDURE — 36415 COLL VENOUS BLD VENIPUNCTURE: CPT | Performed by: PHYSICIAN ASSISTANT

## 2023-11-24 PROCEDURE — 85025 COMPLETE CBC W/AUTO DIFF WBC: CPT | Performed by: PHYSICIAN ASSISTANT

## 2023-11-24 PROCEDURE — 99283 EMERGENCY DEPT VISIT LOW MDM: CPT | Mod: 25

## 2023-11-24 PROCEDURE — 2500000001 HC RX 250 WO HCPCS SELF ADMINISTERED DRUGS (ALT 637 FOR MEDICARE OP): Performed by: EMERGENCY MEDICINE

## 2023-11-24 PROCEDURE — 71046 X-RAY EXAM CHEST 2 VIEWS: CPT | Performed by: RADIOLOGY

## 2023-11-24 PROCEDURE — 80053 COMPREHEN METABOLIC PANEL: CPT | Performed by: PHYSICIAN ASSISTANT

## 2023-11-24 PROCEDURE — 84484 ASSAY OF TROPONIN QUANT: CPT | Performed by: PHYSICIAN ASSISTANT

## 2023-11-24 RX ORDER — GABAPENTIN 100 MG/1
300 CAPSULE ORAL DAILY
Qty: 90 CAPSULE | Refills: 0 | Status: SHIPPED | OUTPATIENT
Start: 2023-11-24 | End: 2024-01-10 | Stop reason: SDUPTHER

## 2023-11-24 RX ORDER — TRAMADOL HYDROCHLORIDE 50 MG/1
50 TABLET ORAL EVERY 6 HOURS PRN
Qty: 10 TABLET | Refills: 0 | Status: SHIPPED | OUTPATIENT
Start: 2023-11-24 | End: 2023-11-27

## 2023-11-24 RX ORDER — TRAMADOL HYDROCHLORIDE 50 MG/1
50 TABLET ORAL ONCE
Status: COMPLETED | OUTPATIENT
Start: 2023-11-24 | End: 2023-11-24

## 2023-11-24 RX ADMIN — TRAMADOL HYDROCHLORIDE 50 MG: 50 TABLET, COATED ORAL at 15:37

## 2023-11-24 ASSESSMENT — PAIN DESCRIPTION - PAIN TYPE: TYPE: ACUTE PAIN

## 2023-11-24 ASSESSMENT — COLUMBIA-SUICIDE SEVERITY RATING SCALE - C-SSRS
1. IN THE PAST MONTH, HAVE YOU WISHED YOU WERE DEAD OR WISHED YOU COULD GO TO SLEEP AND NOT WAKE UP?: NO
2. HAVE YOU ACTUALLY HAD ANY THOUGHTS OF KILLING YOURSELF?: NO
6. HAVE YOU EVER DONE ANYTHING, STARTED TO DO ANYTHING, OR PREPARED TO DO ANYTHING TO END YOUR LIFE?: NO

## 2023-11-24 ASSESSMENT — PAIN SCALES - GENERAL
PAINLEVEL_OUTOF10: 8
PAINLEVEL_OUTOF10: 9
PAINLEVEL_OUTOF10: 8

## 2023-11-24 ASSESSMENT — PAIN - FUNCTIONAL ASSESSMENT: PAIN_FUNCTIONAL_ASSESSMENT: 0-10

## 2023-11-24 ASSESSMENT — PAIN DESCRIPTION - ORIENTATION: ORIENTATION: LEFT

## 2023-11-24 NOTE — ED PROVIDER NOTES
HPI: 82-year-old female with postherpetic neuralgia arrives complaining of worsening the left lateral breast and back pain where her shingles was located.  Her shingles was diagnosed on October 23 and it was pretty severe for approximately a month since then she has been left with postherpetic neuralgia her doctor tried to start her on amitriptyline and that has not helped at all.  The options are all TRAM but he has been informed she will have to stop the amitriptyline which her son threw out in front of me.  I have also canceled the amitriptyline from her prescription list.  I tried to notify her physician with an alternative of gabapentin gabapentin will be started at 300 mg on day 1 600 mg on day 2 and 900 mg on day 3 and then on Monday she will start it at 300 mg 3 times a day.  She got relief with Ultram in the emergency department she had a cardiac work-up done in triage her EKG is normal sinus rhythm there is some left ventricular hypertrophy with some mild left axis deviation this is the same EKG she had in early October.  Her troponin is only 3 her chemistries are at her baseline of a sodium of 130 and normal renal function she does have known anemia with baseline hemoglobin of 9.7 and her chest x-ray is read as no acute disease for this reason the differential diagnosis of aortic aneurysm, aortic dissection, pulmonary embolism or MI has been ruled out safely.  Patient looks very well at discharge I have answered her son's questions and he does understand how we need to taper up the gabapentin.      PMH:     Tobacco Alcohol patient quit tobacco 50 years ago and does not drink alcohol positive for both  FH Heart disease Diabetes  ROS  General Appears in distress  HEENT: No sore throat, No Visual Loss, No Headache, No Ear Pain  Neck: Denies neck pain  Chest: No chest pain, no pleuritic pain, no chest wall injury  Pulmonary: No SOB, No Cough, No Sputum production, No Wheezing  GI: No abdominal pain, no  nausea or vomiting, no diarrhea.  : No dysuria, no frequency, no hematuria.  Extremities: No musculoskeletal pain, normal ambulation, no paresthesia.  Psych: Normal interaction, no anxiety, no depression, no suicidal ideation  Skin: No rashes  Patient has pain as above consistent with postherpetic neuralgia.  ROS is otherwise negative    PE: General: Appears in distress        HEENT: Throat is moist without exudate, midline uvula dentate intact, Tms clear with normal anatomy.        Neck: Supple non tender        Chest CTA, good AE, no wheezing, rales, or rhonci        CVA: RRR S1S2 no S3S4 or murmur        ABD: W/S/NT no HSM, no pulsatile masses, good bowel sounds        Extremities: Excellent distal pulses, brisk capillary refill. Full ROM        Psych: Normal interactions with no signs of depression  or suicidal ideation.        Neuro: Alert and oriented, moves all and feels all.    MDM: 82-year-old female with postherpetic neuralgia arrives complaining of worsening the left lateral breast and back pain where her shingles was located.  Her shingles was diagnosed on October 23 and it was pretty severe for approximately a month since then she has been left with postherpetic neuralgia her doctor tried to start her on amitriptyline and that has not helped at all.  The options are all TRAM but he has been informed she will have to stop the amitriptyline which her son threw out in front of me.  I have also canceled the amitriptyline from her prescription list.  I tried to notify her physician with an alternative of gabapentin gabapentin will be started at 300 mg on day 1 600 mg on day 2 and 900 mg on day 3 and then on Monday she will start it at 300 mg 3 times a day.  She got relief with Ultram in the emergency department she had a cardiac work-up done in triage her EKG is normal sinus rhythm there is some left ventricular hypertrophy with some mild left axis deviation this is the same EKG she had in early October.   Her troponin is only 3 her chemistries are at her baseline of a sodium of 130 and normal renal function she does have known anemia with baseline hemoglobin of 9.7 and her chest x-ray is read as no acute disease for this reason the differential diagnosis of aortic aneurysm, aortic dissection, pulmonary embolism or MI has been ruled out safely.  Patient looks very well at discharge I have answered her son's questions and he does understand how we need to taper up the gabapentin.     John Cooley MD  11/24/23 0947

## 2023-11-24 NOTE — ED TRIAGE NOTES
Pt to ED with concerns of L chest/ribcage/breast pain that radiates from L shoulder blade area. Pt reports recent shingles and this pain has been ongoing x2 weeks. Pt denies cardiac hx, SOB, N/V.

## 2023-11-24 NOTE — ED TRIAGE NOTES
TRIAGE NOTE   I saw the patient as the Clinician in Triage and performed a brief history and physical exam, established acuity, and ordered appropriate tests to develop basic plan of care. Patient will be seen by an JIMMY, resident and/or physician who will independently evaluate the patient. Please see subsequent provider notes for further details and disposition.     Brief HPI: In brief, Nava Gutierrez is a 82 y.o. female that presents for ongoing and persistent left lateral chest wall pain from lateral scapular area to the left breast.  History of recent shingles currently on amitriptyline.  Rash was the end of October and is now resolved.  Pain persist.  No other cardiorespiratory symptoms.  No cough or fever.  No pleuritic pain..     Focused Physical exam:   Nontoxic alert ambulatory.  No skin rash at the site of her pain.  Cardiovascular RRR lungs CTA    Plan/MDM:   Persistent lateral chest wall pain likely postherpetic neuralgia.  Rule out underlying heart or lung disease.    Please see subsequent provider note for further details and disposition

## 2023-11-24 NOTE — ED PROVIDER NOTES
HPI   Chief Complaint   Patient presents with    Breast Pain     Pt to ED with concerns of L chest/ribcage/breast pain that radiates from L shoulder blade area. Pt reports recent shingles and this pain has been ongoing x2 weeks. Pt denies cardiac hx, SOB, N/V.       HPI                    No data recorded                Patient History   Past Medical History:   Diagnosis Date    Abnormal finding of blood chemistry, unspecified 08/23/2016    Abnormal blood chemistry    Benign neoplasm of colon, unspecified 03/21/2014    Tubular adenoma of colon    Dietary counseling and surveillance 06/10/2018    Dietary counseling    Encounter for immunization 11/18/2015    Need for pneumococcal vaccine    Hypomagnesemia 09/07/2018    Hypomagnesemia    Impacted cerumen, left ear 01/27/2020    Impacted cerumen of left ear    Influenza due to other identified influenza virus with other respiratory manifestations 01/15/2015    Influenza A    Osteoarthritis of knee, unspecified 08/30/2018    Osteoarthritis of knee    Other instability, unspecified knee 09/22/2014    Knee instability    Other long term (current) drug therapy 07/25/2014    High risk medication use    Overweight 06/10/2018    Overweight (BMI 25.0-29.9)    Pain in left shoulder 04/02/2020    Acute pain of left shoulder    Pain in unspecified knee 09/22/2014    Acute knee pain    Personal history of diseases of the blood and blood-forming organs and certain disorders involving the immune mechanism 03/21/2014    History of thalassemia    Personal history of other benign neoplasm 03/21/2014    History of other benign neoplasm    Personal history of other diseases of the nervous system and sense organs 08/29/2021    History of benign essential tremor    Personal history of other diseases of the respiratory system 02/23/2017    History of acute bronchitis    Personal history of other diseases of the respiratory system 02/19/2016    History of sinusitis    Personal history of  other endocrine, nutritional and metabolic disease 11/15/2016    History of hypokalemia    Personal history of other endocrine, nutritional and metabolic disease 03/21/2014    History of hyperglycemia    Personal history of other infectious and parasitic diseases 07/07/2014    History of Helicobacter infection    Personal history of other specified conditions 02/19/2016    History of vertigo    Personal history of other specified conditions 11/07/2017    History of nausea and vomiting    Personal history of other specified conditions 08/22/2016    History of abnormal mammogram    Personal history of other specified conditions 03/16/2016    History of dizziness    Personal history of urinary (tract) infections 07/21/2013    History of urinary tract infection    Right upper quadrant pain 11/07/2017    Abdominal pain, acute, right upper quadrant    Unspecified asthma with (acute) exacerbation 11/20/2019    Asthma exacerbation    Unspecified asthma with (acute) exacerbation 11/20/2019    Asthma exacerbation     Past Surgical History:   Procedure Laterality Date    COLONOSCOPY  03/21/2014    Complete Colonoscopy    HYSTERECTOMY  12/02/2013    Hysterectomy    KNEE SURGERY  09/22/2014    Knee Surgery     Family History   Problem Relation Name Age of Onset    Other (Advanced Age) Mother      Tremor Father      Tremor Sister      Tremor Paternal Grandmother       Social History     Tobacco Use    Smoking status: Former     Types: Cigarettes    Smokeless tobacco: Never   Substance Use Topics    Alcohol use: Never    Drug use: Never       Physical Exam   ED Triage Vitals [11/24/23 1134]   Temp Heart Rate Resp BP   36.4 °C (97.6 °F) 86 18 (!) 181/79      SpO2 Temp Source Heart Rate Source Patient Position   98 % Temporal Monitor Sitting      BP Location FiO2 (%)     Right arm --       Physical Exam    ED Course & MDM        Medical Decision Making      Procedure  Procedures

## 2023-12-07 ENCOUNTER — OFFICE VISIT (OUTPATIENT)
Dept: ORTHOPEDIC SURGERY | Facility: HOSPITAL | Age: 83
End: 2023-12-07
Payer: MEDICARE

## 2023-12-07 DIAGNOSIS — M17.12 PRIMARY OSTEOARTHRITIS OF LEFT KNEE: Primary | ICD-10-CM

## 2023-12-07 DIAGNOSIS — M17.12 ARTHRITIS OF LEFT KNEE: ICD-10-CM

## 2023-12-07 PROCEDURE — 99213 OFFICE O/P EST LOW 20 MIN: CPT | Performed by: EMERGENCY MEDICINE

## 2023-12-07 PROCEDURE — 1160F RVW MEDS BY RX/DR IN RCRD: CPT | Performed by: EMERGENCY MEDICINE

## 2023-12-07 PROCEDURE — 2500000005 HC RX 250 GENERAL PHARMACY W/O HCPCS: Performed by: EMERGENCY MEDICINE

## 2023-12-07 PROCEDURE — 2500000004 HC RX 250 GENERAL PHARMACY W/ HCPCS (ALT 636 FOR OP/ED): Performed by: EMERGENCY MEDICINE

## 2023-12-07 PROCEDURE — 1126F AMNT PAIN NOTED NONE PRSNT: CPT | Performed by: EMERGENCY MEDICINE

## 2023-12-07 PROCEDURE — 20611 DRAIN/INJ JOINT/BURSA W/US: CPT | Performed by: EMERGENCY MEDICINE

## 2023-12-07 PROCEDURE — 1159F MED LIST DOCD IN RCRD: CPT | Performed by: EMERGENCY MEDICINE

## 2023-12-07 PROCEDURE — 1036F TOBACCO NON-USER: CPT | Performed by: EMERGENCY MEDICINE

## 2023-12-07 RX ORDER — TRIAMCINOLONE ACETONIDE 40 MG/ML
80 INJECTION, SUSPENSION INTRA-ARTICULAR; INTRAMUSCULAR
Status: COMPLETED | OUTPATIENT
Start: 2023-12-07 | End: 2023-12-07

## 2023-12-07 RX ORDER — LIDOCAINE HYDROCHLORIDE 10 MG/ML
4 INJECTION INFILTRATION; PERINEURAL
Status: COMPLETED | OUTPATIENT
Start: 2023-12-07 | End: 2023-12-07

## 2023-12-07 RX ADMIN — TRIAMCINOLONE ACETONIDE 80 MG: 200 INJECTION, SUSPENSION INTRA-ARTICULAR; INTRAMUSCULAR at 10:37

## 2023-12-07 RX ADMIN — LIDOCAINE HYDROCHLORIDE 4 ML: 10 INJECTION, SOLUTION INFILTRATION; PERINEURAL at 10:37

## 2023-12-07 NOTE — PROGRESS NOTES
Subjective   Nava Gutierrez is a 82 y.o. female who presents for Follow-up and Pain of the Left Knee (DOLI: 9/14/23/)    HPI  12/7/23: Patient turns today for left knee pain.  She has known left knee DJD and we did perform a left knee aspiration and injection on 9/14/2023.  She felt this worked quite well for her and would like to have a repeat injection performed today.  No additional complaints this time.    9/14/23: Patient returns today for left knee pain. We did perform a left knee aspiration and injection on 6/15/2023. She felt this worked quite well for her up until recently. She like to have a repeat injection performed today. She denies any further injuries. She has no additional complaints at this time.    6/15/23: Patient turns today for left knee pain. We performed a left knee aspiration and injection on 3/16/2023 that worked quite well for her. She presents today for repeat injection. She denies any additional injuries. She has no additional complaints today.    3/16/23: Patient returns today for left knee pain. She states previous injection she received on 12/15/2022 worked quite well until recently. She presents today requesting a repeat injection. She denies any further injuries. She has no additional complaints at this time.    12/15/22: Patient returns today for left knee pain. She states the preinjection she received on 9/22/2022 worked quite well up until recently. She presents today requesting repeat injection. She denies any further injuries. She has no additional complaints at this time.    9/22/22: 81-year-old female present known to me is presenting with complaint of chronic left knee pain. She has known moderate to severe left knee arthritis. She did have a knee aspiration and injection by Dr. Joseph on 5/18/2022 that worked quite well for her up until recently. She presents today requesting a repeat injection. She denies any interval injury. She describes the pain as deep and throbbing.  She has no additional complaints today.     ROS: All pertinent positive symptoms are included in the history of present illness.    All other systems have been reviewed and are negative and noncontributory to this patient's current ailments.    Objective     There were no vitals filed for this visit.    Physical Exam  General/Constitutional: No apparent distress. Well-nourished and well developed.  Head: Normocephalic, Atraumatic.   Eyes: EOMI.  Vascular: No edema, swelling or tenderness, except as noted in detailed exam.  Respiratory: Non-labored breathing.  Integumentary: No impressive skin lesions present, except as noted in detailed exam.  Neurological: Oriented to person, place, and time.  Psychological: Normal mood and affect.  Musculoskeletal: Normal, except as noted in detailed exam.  Left knee  Flexion 120. Extension 5. Varus deformity present. Crepitus present with knee flexion/extension. Positive joint hypertrophy. Grade 2 effusion. No ecchymosis. Muscle strength 5 out of 5 with flexion and extension. Lachman negative. Anterior drawer negative. Posterior drawer negative. Valgus stress negative. Varus stress negative. Kamla positive.     Patient ID: Nava Gutierrez is a 82 y.o. female.    L Inj/Asp: L knee on 12/7/2023 10:37 AM  Indications: pain and joint swelling  Details: 18 G needle, ultrasound-guided superolateral approach  Medications: 80 mg triamcinolone acetonide 40 mg/mL; 4 mL lidocaine 10 mg/mL (1 %)  Aspirate: 16 mL clear, blood-tinged and yellow  Outcome: tolerated well, no immediate complications  Procedure, treatment alternatives, risks and benefits explained, specific risks discussed. Consent was given by the patient.             Assessment/Plan   Problem List Items Addressed This Visit       Primary osteoarthritis of left knee - Primary     Other Visit Diagnoses       Arthritis of left knee        Relevant Orders    Point of Care Ultrasound (Completed)          Considering that she is done  well with previous corticosteroid injections, I feel that a repeat injection is warranted. Discussed risks versus benefits of having injection performed. Patient has elected to proceed with procedure. Please refer to procedure note above. Discussed with patient to limit weightbearing activities over the next 24-48 hours, they can then progress to full activities as tolerated. Discussed with patient to avoid water submersion over the next 2 days. Discussed with patient to call me immediately if they develop worsening pain, rash, erythema, or fevers. Patient should follow-up as needed if pain persists or worsens.    Boom Ponce,   Sports Medicine  [facility]     ** Please excuse any errors in grammar or translation related to this dictation. Voice recognition software was utilized to prepare this document. **

## 2023-12-08 DIAGNOSIS — I10 PRIMARY HYPERTENSION: ICD-10-CM

## 2023-12-08 DIAGNOSIS — K21.9 GASTROESOPHAGEAL REFLUX DISEASE WITHOUT ESOPHAGITIS: ICD-10-CM

## 2023-12-10 RX ORDER — VALSARTAN 320 MG/1
320 TABLET ORAL DAILY
Qty: 90 TABLET | Refills: 0 | Status: SHIPPED | OUTPATIENT
Start: 2023-12-10 | End: 2024-01-10 | Stop reason: SDUPTHER

## 2023-12-10 RX ORDER — OMEPRAZOLE 40 MG/1
CAPSULE, DELAYED RELEASE ORAL
Qty: 90 CAPSULE | Refills: 0 | Status: SHIPPED | OUTPATIENT
Start: 2023-12-10 | End: 2024-01-10 | Stop reason: SDUPTHER

## 2023-12-11 DIAGNOSIS — I10 PRIMARY HYPERTENSION: ICD-10-CM

## 2023-12-11 DIAGNOSIS — E78.00 PURE HYPERCHOLESTEROLEMIA: ICD-10-CM

## 2023-12-11 DIAGNOSIS — G25.0 BENIGN FAMILIAL TREMOR: ICD-10-CM

## 2023-12-11 RX ORDER — PROPRANOLOL HYDROCHLORIDE 60 MG/1
60 CAPSULE, EXTENDED RELEASE ORAL DAILY
Qty: 80 CAPSULE | Refills: 0 | Status: SHIPPED | OUTPATIENT
Start: 2023-12-11 | End: 2024-01-10 | Stop reason: SDUPTHER

## 2023-12-11 RX ORDER — NIFEDIPINE 90 MG/1
TABLET, EXTENDED RELEASE ORAL
Qty: 90 TABLET | Refills: 0 | Status: SHIPPED | OUTPATIENT
Start: 2023-12-11 | End: 2024-01-10 | Stop reason: SDUPTHER

## 2023-12-11 RX ORDER — PRAVASTATIN SODIUM 20 MG/1
20 TABLET ORAL DAILY
Qty: 90 TABLET | Refills: 0 | Status: SHIPPED | OUTPATIENT
Start: 2023-12-11 | End: 2024-01-10 | Stop reason: SDUPTHER

## 2023-12-14 DIAGNOSIS — D50.9 MICROCYTIC ANEMIA: Primary | ICD-10-CM

## 2023-12-14 RX ORDER — FOLIC ACID 1 MG/1
1 TABLET ORAL DAILY
Qty: 30 TABLET | Refills: 0 | Status: SHIPPED | OUTPATIENT
Start: 2023-12-14 | End: 2024-02-12 | Stop reason: SDUPTHER

## 2024-01-10 ENCOUNTER — OFFICE VISIT (OUTPATIENT)
Dept: PRIMARY CARE | Facility: CLINIC | Age: 84
End: 2024-01-10
Payer: MEDICARE

## 2024-01-10 VITALS
HEIGHT: 59 IN | SYSTOLIC BLOOD PRESSURE: 148 MMHG | HEART RATE: 72 BPM | WEIGHT: 140 LBS | BODY MASS INDEX: 28.22 KG/M2 | DIASTOLIC BLOOD PRESSURE: 64 MMHG | OXYGEN SATURATION: 96 %

## 2024-01-10 DIAGNOSIS — F41.9 ANXIETY: ICD-10-CM

## 2024-01-10 DIAGNOSIS — G25.0 BENIGN FAMILIAL TREMOR: ICD-10-CM

## 2024-01-10 DIAGNOSIS — K21.9 GASTROESOPHAGEAL REFLUX DISEASE WITHOUT ESOPHAGITIS: ICD-10-CM

## 2024-01-10 DIAGNOSIS — M17.12 PRIMARY OSTEOARTHRITIS OF LEFT KNEE: ICD-10-CM

## 2024-01-10 DIAGNOSIS — I10 PRIMARY HYPERTENSION: Primary | ICD-10-CM

## 2024-01-10 DIAGNOSIS — E87.1 HYPONATREMIA: ICD-10-CM

## 2024-01-10 DIAGNOSIS — B02.29 POST HERPETIC NEURALGIA: ICD-10-CM

## 2024-01-10 DIAGNOSIS — J45.20 MILD INTERMITTENT ASTHMA, UNSPECIFIED WHETHER COMPLICATED (HHS-HCC): ICD-10-CM

## 2024-01-10 DIAGNOSIS — E78.00 PURE HYPERCHOLESTEROLEMIA: ICD-10-CM

## 2024-01-10 PROCEDURE — 3077F SYST BP >= 140 MM HG: CPT | Performed by: FAMILY MEDICINE

## 2024-01-10 PROCEDURE — 3078F DIAST BP <80 MM HG: CPT | Performed by: FAMILY MEDICINE

## 2024-01-10 PROCEDURE — 1036F TOBACCO NON-USER: CPT | Performed by: FAMILY MEDICINE

## 2024-01-10 PROCEDURE — 1159F MED LIST DOCD IN RCRD: CPT | Performed by: FAMILY MEDICINE

## 2024-01-10 PROCEDURE — 1160F RVW MEDS BY RX/DR IN RCRD: CPT | Performed by: FAMILY MEDICINE

## 2024-01-10 PROCEDURE — 1126F AMNT PAIN NOTED NONE PRSNT: CPT | Performed by: FAMILY MEDICINE

## 2024-01-10 PROCEDURE — 99214 OFFICE O/P EST MOD 30 MIN: CPT | Performed by: FAMILY MEDICINE

## 2024-01-10 RX ORDER — VALSARTAN 320 MG/1
320 TABLET ORAL DAILY
Qty: 90 TABLET | Refills: 0 | Status: SHIPPED | OUTPATIENT
Start: 2024-01-10 | End: 2024-05-08

## 2024-01-10 RX ORDER — NIFEDIPINE 90 MG/1
90 TABLET, EXTENDED RELEASE ORAL DAILY
Qty: 90 TABLET | Refills: 0 | Status: SHIPPED | OUTPATIENT
Start: 2024-01-10

## 2024-01-10 RX ORDER — BUSPIRONE HYDROCHLORIDE 10 MG/1
10 TABLET ORAL DAILY PRN
Qty: 90 TABLET | Refills: 0 | Status: SHIPPED | OUTPATIENT
Start: 2024-01-10

## 2024-01-10 RX ORDER — GABAPENTIN 300 MG/1
300 CAPSULE ORAL NIGHTLY
Qty: 90 CAPSULE | Refills: 0 | Status: SHIPPED | OUTPATIENT
Start: 2024-01-10 | End: 2024-03-12

## 2024-01-10 RX ORDER — PRAVASTATIN SODIUM 20 MG/1
20 TABLET ORAL DAILY
Qty: 90 TABLET | Refills: 0 | Status: SHIPPED | OUTPATIENT
Start: 2024-01-10

## 2024-01-10 RX ORDER — PROPRANOLOL HYDROCHLORIDE 60 MG/1
60 CAPSULE, EXTENDED RELEASE ORAL DAILY
Qty: 80 CAPSULE | Refills: 0 | Status: SHIPPED | OUTPATIENT
Start: 2024-01-10 | End: 2024-04-01

## 2024-01-10 RX ORDER — GABAPENTIN 100 MG/1
100 CAPSULE ORAL
Qty: 90 CAPSULE | Refills: 0 | Status: SHIPPED | OUTPATIENT
Start: 2024-01-10 | End: 2024-01-19 | Stop reason: SDUPTHER

## 2024-01-10 RX ORDER — SODIUM CHLORIDE 1000 MG
500 TABLET, SOLUBLE MISCELLANEOUS DAILY
Qty: 45 TABLET | Refills: 0 | Status: SHIPPED | OUTPATIENT
Start: 2024-01-10 | End: 2024-01-19 | Stop reason: SDUPTHER

## 2024-01-10 RX ORDER — TOPIRAMATE 50 MG/1
50 TABLET, FILM COATED ORAL 2 TIMES DAILY
Qty: 180 TABLET | Refills: 0 | Status: SHIPPED | OUTPATIENT
Start: 2024-01-10 | End: 2024-04-15

## 2024-01-10 RX ORDER — OMEPRAZOLE 40 MG/1
CAPSULE, DELAYED RELEASE ORAL
Qty: 90 CAPSULE | Refills: 0 | Status: SHIPPED | OUTPATIENT
Start: 2024-01-10 | End: 2024-05-08

## 2024-01-10 ASSESSMENT — ENCOUNTER SYMPTOMS
OCCASIONAL FEELINGS OF UNSTEADINESS: 1
LOSS OF SENSATION IN FEET: 0
DEPRESSION: 0

## 2024-01-10 NOTE — PROGRESS NOTES
"Subjective   Patient ID: Nava Gutierrez is a 83 y.o. female who presents for Follow-up (6 Month).    HPI   The patient states that she is taking nifedipine and valsartan for her hypertension, sodium tablets for hyponatremia, pravastatin for hyperlipidemia, Topamax, propanolol and Primidone for essential tremors, omeprazole for GERD and Flovent as well as Albuterol as needed for her asthma. She is taking these medications as prescribed and is tolerating them well. She reports that she was advised to stop taking iron supplements because her numbers are stable at this time. She complains of post hepatic neuralgia pain and has tried gabapentin however, the current dose does not improve it. The patient is interested in restarting Buspar to treat her anxiety.    Review of Systems  Constitutional: No fever or chills  Cardiovascular: no chest pain, no palpitations and no syncope.   Respiratory: no cough, no shortness of breath during exertion and no shortness of breath at rest.   Gastrointestinal: no abdominal pain, no nausea and no vomiting.  Neuro: No Headache, no dizziness  MSK: + shoulder pain     Objective   BP (!) 190/100   Pulse 72   Ht 1.499 m (4' 11\")   Wt 63.5 kg (140 lb)   SpO2 96%   BMI 28.28 kg/m²     Physical Exam  Constitutional: Alert and in no acute distress. Well developed, well nourished  Head and Face: Head and face: Normal.    Cardiovascular: Heart rate and rhythm were normal, normal S1 and S2. No peripheral edema.   Pulmonary: No respiratory distress. Clear bilateral breath sounds.  Musculoskeletal: Gait and station: Normal. Muscle strength/tone: Normal.   Skin: Normal skin color and pigmentation, normal skin turgor, and no rash.    Psychiatric: Judgment and insight: Intact. Mood and affect: Normal.    Lab Results   Component Value Date    WBC 5.0 11/24/2023    HGB 9.7 (L) 11/24/2023    HCT 32.0 (L) 11/24/2023     11/24/2023    CHOL 184 06/15/2023    TRIG 199 (H) 06/15/2023    HDL 47.9 " 06/15/2023    ALT 20 11/24/2023    AST 23 11/24/2023     (L) 11/24/2023    K 4.9 11/24/2023    CL 95 (L) 11/24/2023    CREATININE 0.85 11/24/2023    BUN 12 11/24/2023    CO2 28 11/24/2023    TSH 3.37 06/09/2023    INR 1.1 01/31/2018    HGBA1C 5.9 (A) 06/09/2023       XR chest 2 views  Narrative: Interpreted By:  Schoenberger, Joseph,   STUDY:  XR CHEST 2 VIEWS;  11/24/2023 1:03 pm      INDICATION:  Signs/Symptoms:Lateral rib thorax pain.      COMPARISON:  None.      ACCESSION NUMBER(S):  [id number]      ORDERING CLINICIAN:  ARIANNA DODGE      FINDINGS:                  CARDIOMEDIASTINAL SILHOUETTE:  Cardiomediastinal silhouette is normal in size and configuration.      LUNGS:  There is no pleural effusion, pneumothorax, or significant focal lung  opacity.      ABDOMEN:  No remarkable upper abdominal findings.      BONES:  No acute osseous changes.      Impression: 1.  No evidence of acute cardiopulmonary process.              MACRO:  None      Signed by: Joseph Schoenberger 11/24/2023 1:08 PM  Dictation workstation:   [id number]      Assessment/Plan   Diagnoses and all orders for this visit:  Primary hypertension  -     Follow Up In [facility] - PCP - Established  -     valsartan (Diovan) 320 mg tablet; Take 1 tablet (320 mg) by mouth once daily.  -     NIFEdipine ER (NIFEdipine XL) 90 mg 24 hr tablet; Take 1 tablet (90 mg) by mouth once daily. Do not crush, chew, or split.  -     Follow Up In [facility] - PCP - Established; Future  Hyponatremia  -     sodium chloride 1,000 mg tablet; Take 0.5 tablets (0.5 g) by mouth once daily.  Pure hypercholesterolemia  -     pravastatin (Pravachol) 20 mg tablet; Take 1 tablet (20 mg) by mouth once daily.  Benign familial tremor  -     topiramate (Topamax) 50 mg tablet; Take 1 tablet (50 mg) by mouth 2 times a day.  -     propranolol LA (Inderal LA) 60 mg 24 hr capsule; Take 1 capsule (60 mg) by mouth once daily.  -     Disability  Placard  Gastroesophageal reflux disease without esophagitis  -     omeprazole (PriLOSEC) 40 mg DR capsule; TAKE 1 CAPSULE BY MOUTH DAILY IN THE MORNING BEFORE A MEAL  Mild intermittent asthma, unspecified whether complicated  Post herpetic neuralgia  -     gabapentin (Neurontin) 100 mg capsule; Take 1 capsule (100 mg) by mouth once daily with breakfast. 3 caps orally once a day X 30 days  -     gabapentin (Neurontin) 300 mg capsule; Take 1 capsule (300 mg) by mouth once daily at bedtime.  Primary osteoarthritis of left knee  -     Disability Placard  Anxiety  -     busPIRone (Buspar) 10 mg tablet; Take 1 tablet (10 mg) by mouth once daily as needed (anxiety).        Dear Nava Gutierrez     It was my pleasure to take care of you today in the office. Below are the things we discussed today:    1.  Hypertension: Continue nifedipine and valsartan.     2. GERD: Continue omeprazole.      3. Hyperlipidemia: Continue pravastatin.     4. Post hepatic neuralgia pain: Take 100 mg gabapentin in the AM and 300 mg at night.     5.  Anxiety: Restart Buspar, take once daily as needed.    6. Asthma: Continue Flovent and Albuterol as needed.     7. Essential tremors: Continue Primidone, Topamax and Propanolol.    8. Hyponatremia: Continue sodium tablets.    9. Osteoarthritis of the left knee: Disability placard given.     Follow up in 3 months.    Your yearly Physical is due in: July 2024   When you call the office for your yearly Physical, please ask them to inform me to order your blood work, so that you can get the fasting blood work before your appointment and we can discuss the results at your physical.      Please call me if any questions arise from now until your next visit. I will call you after I am done seeing patients. A Doctor is always available by phone when the office is closed. Please feel free to call for help with any problem that you feel shouldn't wait until the office re-opens.     Scribe Attestation  By signing  my name below, I, Edy Kim   attest that this documentation has been prepared under the direction and in the presence of Terra Gray MD.

## 2024-01-11 ENCOUNTER — APPOINTMENT (OUTPATIENT)
Dept: HEMATOLOGY/ONCOLOGY | Facility: HOSPITAL | Age: 84
End: 2024-01-11
Payer: MEDICARE

## 2024-01-11 ENCOUNTER — LAB (OUTPATIENT)
Dept: LAB | Facility: HOSPITAL | Age: 84
End: 2024-01-11
Payer: MEDICARE

## 2024-01-11 DIAGNOSIS — I10 PRIMARY HYPERTENSION: ICD-10-CM

## 2024-01-11 LAB
ALBUMIN SERPL BCP-MCNC: 4.3 G/DL (ref 3.4–5)
ALP SERPL-CCNC: 83 U/L (ref 33–136)
ALT SERPL W P-5'-P-CCNC: 13 U/L (ref 7–45)
ANION GAP SERPL CALC-SCNC: 12 MMOL/L (ref 10–20)
AST SERPL W P-5'-P-CCNC: 18 U/L (ref 9–39)
BILIRUB SERPL-MCNC: 0.3 MG/DL (ref 0–1.2)
BUN SERPL-MCNC: 13 MG/DL (ref 6–23)
CALCIUM SERPL-MCNC: 9.1 MG/DL (ref 8.6–10.3)
CHLORIDE SERPL-SCNC: 98 MMOL/L (ref 98–107)
CO2 SERPL-SCNC: 28 MMOL/L (ref 21–32)
CREAT SERPL-MCNC: 0.83 MG/DL (ref 0.5–1.05)
EGFRCR SERPLBLD CKD-EPI 2021: 71 ML/MIN/1.73M*2
ERYTHROCYTE [DISTWIDTH] IN BLOOD BY AUTOMATED COUNT: 15.9 % (ref 11.5–14.5)
GLUCOSE SERPL-MCNC: 109 MG/DL (ref 74–99)
HCT VFR BLD AUTO: 30.8 % (ref 36–46)
HGB BLD-MCNC: 9.5 G/DL (ref 12–16)
MCH RBC QN AUTO: 20.9 PG (ref 26–34)
MCHC RBC AUTO-ENTMCNC: 30.8 G/DL (ref 32–36)
MCV RBC AUTO: 68 FL (ref 80–100)
NRBC BLD-RTO: 0 /100 WBCS (ref 0–0)
PLATELET # BLD AUTO: 287 X10*3/UL (ref 150–450)
POTASSIUM SERPL-SCNC: 5.1 MMOL/L (ref 3.5–5.3)
PROT SERPL-MCNC: 7.5 G/DL (ref 6.4–8.2)
RBC # BLD AUTO: 4.54 X10*6/UL (ref 4–5.2)
SODIUM SERPL-SCNC: 133 MMOL/L (ref 136–145)
WBC # BLD AUTO: 6.8 X10*3/UL (ref 4.4–11.3)

## 2024-01-11 PROCEDURE — 85027 COMPLETE CBC AUTOMATED: CPT

## 2024-01-11 PROCEDURE — 80053 COMPREHEN METABOLIC PANEL: CPT

## 2024-01-11 PROCEDURE — 36415 COLL VENOUS BLD VENIPUNCTURE: CPT

## 2024-01-15 ENCOUNTER — APPOINTMENT (OUTPATIENT)
Dept: HEMATOLOGY/ONCOLOGY | Facility: HOSPITAL | Age: 84
End: 2024-01-15
Payer: MEDICARE

## 2024-01-15 ENCOUNTER — LAB (OUTPATIENT)
Dept: LAB | Facility: HOSPITAL | Age: 84
End: 2024-01-15
Payer: MEDICARE

## 2024-01-15 ENCOUNTER — OFFICE VISIT (OUTPATIENT)
Dept: HEMATOLOGY/ONCOLOGY | Facility: HOSPITAL | Age: 84
End: 2024-01-15
Payer: MEDICARE

## 2024-01-15 VITALS
RESPIRATION RATE: 17 BRPM | HEART RATE: 74 BPM | DIASTOLIC BLOOD PRESSURE: 66 MMHG | WEIGHT: 140.8 LBS | OXYGEN SATURATION: 99 % | TEMPERATURE: 99 F | SYSTOLIC BLOOD PRESSURE: 174 MMHG | BODY MASS INDEX: 28.44 KG/M2

## 2024-01-15 DIAGNOSIS — D50.9 MICROCYTIC ANEMIA: Primary | ICD-10-CM

## 2024-01-15 DIAGNOSIS — D64.9 ANEMIA, UNSPECIFIED TYPE: ICD-10-CM

## 2024-01-15 LAB
CRP SERPL-MCNC: 2.05 MG/DL
ERYTHROCYTE [SEDIMENTATION RATE] IN BLOOD BY WESTERGREN METHOD: 31 MM/H (ref 0–30)
FERRITIN SERPL-MCNC: 364 NG/ML (ref 8–150)
FOLATE SERPL-MCNC: >24 NG/ML
IRON SATN MFR SERPL: 27 % (ref 25–45)
IRON SERPL-MCNC: 76 UG/DL (ref 35–150)
TIBC SERPL-MCNC: 280 UG/DL (ref 240–445)
UIBC SERPL-MCNC: 204 UG/DL (ref 110–370)
VIT B12 SERPL-MCNC: 482 PG/ML (ref 211–911)

## 2024-01-15 PROCEDURE — 86140 C-REACTIVE PROTEIN: CPT

## 2024-01-15 PROCEDURE — 82746 ASSAY OF FOLIC ACID SERUM: CPT

## 2024-01-15 PROCEDURE — 1036F TOBACCO NON-USER: CPT | Performed by: PHYSICIAN ASSISTANT

## 2024-01-15 PROCEDURE — 82607 VITAMIN B-12: CPT

## 2024-01-15 PROCEDURE — 36415 COLL VENOUS BLD VENIPUNCTURE: CPT

## 2024-01-15 PROCEDURE — 85652 RBC SED RATE AUTOMATED: CPT

## 2024-01-15 PROCEDURE — 83540 ASSAY OF IRON: CPT

## 2024-01-15 PROCEDURE — 3078F DIAST BP <80 MM HG: CPT | Performed by: PHYSICIAN ASSISTANT

## 2024-01-15 PROCEDURE — 82668 ASSAY OF ERYTHROPOIETIN: CPT

## 2024-01-15 PROCEDURE — 3077F SYST BP >= 140 MM HG: CPT | Performed by: PHYSICIAN ASSISTANT

## 2024-01-15 PROCEDURE — 1125F AMNT PAIN NOTED PAIN PRSNT: CPT | Performed by: PHYSICIAN ASSISTANT

## 2024-01-15 PROCEDURE — 99214 OFFICE O/P EST MOD 30 MIN: CPT | Performed by: PHYSICIAN ASSISTANT

## 2024-01-15 PROCEDURE — 1159F MED LIST DOCD IN RCRD: CPT | Performed by: PHYSICIAN ASSISTANT

## 2024-01-15 PROCEDURE — 82728 ASSAY OF FERRITIN: CPT

## 2024-01-15 PROCEDURE — 1160F RVW MEDS BY RX/DR IN RCRD: CPT | Performed by: PHYSICIAN ASSISTANT

## 2024-01-15 ASSESSMENT — ENCOUNTER SYMPTOMS
OCCASIONAL FEELINGS OF UNSTEADINESS: 1
LOSS OF SENSATION IN FEET: 0
DEPRESSION: 0

## 2024-01-15 ASSESSMENT — PAIN SCALES - GENERAL: PAINLEVEL: 7

## 2024-01-15 NOTE — PROGRESS NOTES
Patient ID: Nava Gutierrez is a 83 y.o. female.  Referring Physician: No referring provider defined for this encounter.  Primary Care Provider: Terra Gray MD  Visit Type: Follow Up    Location: [facility] - Main  Diagnosis/Reason: Alpha Thalssemia Minor, Anemia Chronic Inflammation    Interval Hx  1/15/24  Nava Gutierrez is a 83 y.o. female following up today for alpha thalassemia minor, anemia of chronic inflammation    NOTE:  1/15/24 - Patient was originally followed by ANTONINA York and is transferring care to me today. Per ASHLEE Leo's last note, patient's anemia is multifactorial and attributed to alpha thal minor and anemia of chronic inflammation. Patient has been taking folic acid 1 mg PO QD w/o adverse effect or reaction.    Patient denies weight loss, abnormal bruising and bleeding, hematuria, blood in stool, dark/black stools, epistaxis, oral/gingival bleeding, lymphadenopathy, recurrent infections, recurrent fevers, night sweats, early satiety, abdominal pain, bone pain, chest pain, palpitations, SOB, HASTINGS, fatigue, dizziness, lightheadedness, PICA.    Relevant Hx  7/13/23 - Last Visit w/ ANTONINA York  Ms. Gutierrez is a very pleasant 82-year-old  female with a longstanding history of microcytic anemia who has been empirically treated with vitamin B12 and iron supplementation without improvement in her anemia. She has had an extensive workup  for this microcytic anemia, and the conclusion is that she likely has alpha thalassemia minor. Normally Hgb 10s but has decreased to 9.5 - 9.9 this last July 2021.      Today, patient presents for follow-up. Reports feeling well overall. Taking folic acid daily and iron every other day w/o issue. Eating iron rich foods - beans, spinach, kale. No other new issues. To visit family this Aug-Sept in [place].      Denies abnormal weight loss, night sweats, fever. Denies chills, abnormal sob at rest, cp, n/v/d, n/t. No PICA. Denies any abnormal  bleeding or bruising. No recurrent infections or lymphadenopathy. Has chronic joint/body pain - gets injections to lt knee.  No known blood disorders in family. Has had surgery in past w/o issue.    PMHx:  Active Ambulatory Problems     Diagnosis Date Noted    Age related osteoporosis 01/25/2023    Allergic rhinitis 01/25/2023    Anxiety 01/25/2023    Asthma 01/25/2023    Asymptomatic hyperuricemia 01/25/2023    Benign familial tremor 01/25/2023    Constipation 01/25/2023    GERD (gastroesophageal reflux disease) 01/25/2023    Glaucoma 01/25/2023    Hyperlipemia 01/25/2023    Hypertension 01/25/2023    Hyponatremia 01/25/2023    Microcytic anemia 01/25/2023    Plantar fasciitis 01/25/2023    Pre-diabetes 01/25/2023    Primary osteoarthritis of left knee 01/25/2023    Seasonal allergies 01/25/2023    Vitamin D deficiency 01/25/2023    Post herpetic neuralgia 01/10/2024     Resolved Ambulatory Problems     Diagnosis Date Noted    No Resolved Ambulatory Problems     Past Medical History:   Diagnosis Date    Abnormal finding of blood chemistry, unspecified 08/23/2016    Benign neoplasm of colon, unspecified 03/21/2014    Dietary counseling and surveillance 06/10/2018    Encounter for immunization 11/18/2015    Hypomagnesemia 09/07/2018    Impacted cerumen, left ear 01/27/2020    Influenza due to other identified influenza virus with other respiratory manifestations 01/15/2015    Osteoarthritis of knee, unspecified 08/30/2018    Other instability, unspecified knee 09/22/2014    Other long term (current) drug therapy 07/25/2014    Overweight 06/10/2018    Pain in left shoulder 04/02/2020    Pain in unspecified knee 09/22/2014    Personal history of diseases of the blood and blood-forming organs and certain disorders involving the immune mechanism 03/21/2014    Personal history of other benign neoplasm 03/21/2014    Personal history of other diseases of the nervous system and sense organs 08/29/2021    Personal history of  other diseases of the respiratory system 02/23/2017    Personal history of other diseases of the respiratory system 02/19/2016    Personal history of other endocrine, nutritional and metabolic disease 11/15/2016    Personal history of other endocrine, nutritional and metabolic disease 03/21/2014    Personal history of other infectious and parasitic diseases 07/07/2014    Personal history of other specified conditions 02/19/2016    Personal history of other specified conditions 11/07/2017    Personal history of other specified conditions 08/22/2016    Personal history of other specified conditions 03/16/2016    Personal history of urinary (tract) infections 07/21/2013    Right upper quadrant pain 11/07/2017    Unspecified asthma with (acute) exacerbation 11/20/2019    Unspecified asthma with (acute) exacerbation 11/20/2019     Microcytic anemia, longstanding, related to thalassemia.  Essential tremor  Hypertension.  Diabetes.  Asthma.  Tubular adenoma on colonoscopy in 2008. Repeat 2014 NL  Helicobacter pylori gastritis 5/2014, treated with antibiotics  GERD - on PPI    PSHx:  Past Surgical History:   Procedure Laterality Date    COLONOSCOPY  03/21/2014    Complete Colonoscopy    HYSTERECTOMY  12/02/2013    Hysterectomy    KNEE SURGERY  09/22/2014    Knee Surgery       FHx:  Family History   Problem Relation Name Age of Onset    Other (Advanced Age) Mother      Tremor Father      Tremor Sister      Tremor Paternal Grandmother         Social Hx:  Nava Gutierrez    reports that she has quit smoking. Her smoking use included cigarettes. She has never used smokeless tobacco.  She  reports no history of alcohol use.  She  reports no history of drug use.  Social History     Socioeconomic History    Marital status:      Spouse name: Not on file    Number of children: 1    Years of education: Not on file    Highest education level: Not on file   Occupational History    Not on file   Tobacco Use    Smoking status:  Former     Types: Cigarettes    Smokeless tobacco: Never   Substance and Sexual Activity    Alcohol use: Never    Drug use: Never    Sexual activity: Not Currently     Birth control/protection: Post-menopausal   Other Topics Concern    Not on file   Social History Narrative    Not on file     Social Determinants of Health     Financial Resource Strain: Not on file   Food Insecurity: Not on file   Transportation Needs: Not on file   Physical Activity: Not on file   Stress: Not on file   Social Connections: Not on file   Intimate Partner Violence: Not on file   Housing Stability: Not on file     Alcohol Use: Denies  Smoking: Former smoker - Quit 1975  Recreational Drug Use: Denies  Special Diets: Regular diet    Medications and allergies reviewed in EMR.    ROS:  Review of Systems - Oncology   10 point review of systems negative except as state in HPI.    Vitals & Statistics:  Objective   BSA: There is no height or weight on file to calculate BSA.  There were no vitals taken for this visit.    Physical Exam:  Physical Exam  Vitals and nursing note reviewed.   Constitutional:       Appearance: Normal appearance. She is normal weight.   HENT:      Head: Normocephalic and atraumatic.      Right Ear: External ear normal.      Left Ear: External ear normal.      Nose: Nose normal.      Mouth/Throat:      Mouth: Mucous membranes are moist.      Pharynx: Oropharynx is clear.   Eyes:      Extraocular Movements: Extraocular movements intact.      Conjunctiva/sclera: Conjunctivae normal.      Pupils: Pupils are equal, round, and reactive to light.   Cardiovascular:      Rate and Rhythm: Normal rate and regular rhythm.      Pulses: Normal pulses.      Heart sounds: Normal heart sounds.   Pulmonary:      Effort: Pulmonary effort is normal.      Breath sounds: Normal breath sounds.   Abdominal:      General: Abdomen is flat. Bowel sounds are normal.      Palpations: Abdomen is soft.      Comments: No masses or organomegaly  palpable during exam   Musculoskeletal:         General: Normal range of motion.      Cervical back: Normal range of motion.      Comments: Ambulates w/ cane   Lymphadenopathy:      Comments: No lymphadenopathy palpable   Skin:     General: Skin is warm and dry.      Capillary Refill: Capillary refill takes less than 2 seconds.   Neurological:      Mental Status: She is alert and oriented to person, place, and time. Mental status is at baseline.      Comments: Tremor present (chronic condition for patient)   Psychiatric:         Mood and Affect: Mood normal.         Behavior: Behavior normal.         Thought Content: Thought content normal.         Judgment: Judgment normal.           Results:  Lab Results   Component Value Date    WBC 6.8 01/11/2024    NEUTROABS 1.66 11/24/2023    IGABSOL 0.02 11/24/2023    LYMPHSABS 2.52 11/24/2023    MONOSABS 0.52 11/24/2023    EOSABS 0.21 11/24/2023    BASOSABS 0.07 11/24/2023    RBC 4.54 01/11/2024    MCV 68 (L) 01/11/2024    MCHC 30.8 (L) 01/11/2024    HGB 9.5 (L) 01/11/2024    HCT 30.8 (L) 01/11/2024     01/11/2024     Lab Results   Component Value Date    RETICCTPCT 1.1 07/13/2023      Lab Results   Component Value Date    CREATININE 0.83 01/11/2024    BUN 13 01/11/2024    EGFR 71 01/11/2024     (L) 01/11/2024    K 5.1 01/11/2024    CL 98 01/11/2024    CO2 28 01/11/2024      Lab Results   Component Value Date    ALT 13 01/11/2024    AST 18 01/11/2024    ALKPHOS 83 01/11/2024    BILITOT 0.3 01/11/2024      Lab Results   Component Value Date    TSH 3.37 06/09/2023     Lab Results   Component Value Date    TSH 3.37 06/09/2023     Lab Results   Component Value Date    IRON 94 07/13/2023    TIBC 273 07/13/2023    FERRITIN 442 (H) 07/13/2023      Lab Results   Component Value Date    JNDVMQUX44 518 07/13/2023      Lab Results   Component Value Date    FOLATE >24.0 07/13/2023     Lab Results   Component Value Date    SEDRATE 52 (H) 07/13/2023      Lab Results  "  Component Value Date    CRP 2.33 (A) 07/13/2023      No results found for: \"ALEXUS\"  Lab Results   Component Value Date     07/13/2023     Lab Results   Component Value Date    HAPTOGLOBIN 145 01/12/2023     Lab Results   Component Value Date    SPEP NORMAL 07/30/2021     No results found for: \"IGG\", \"IGM\", \"IGA\"  No results found for: \"HEPATOT\", \"HEPAIGM\", \"HEPBCIGM\", \"HEPBCAB\", \"HEPBSAG\", \"HEPCAB\"  No results found for: \"HIV1X2\"    Assessment:  Nava Gutierrez is a 83 y.o. female following up today for alpha thal minor, anemia of chronic inflammation    I reviewed patient's chart including but not limited to labs, imaging, surgical/procedure notes, pathology, hospital notes, doctor's notes.     1/11/24 results:  WBC count WNL - No Differental  Microcytic, hypochromic anemia w/ Hb at 9.5 - RBC count WNL, Hct low, RDW elevated  Platelets WNL  Remaining results pending at time of writing    Plan:  1) Alpha Thalassemia Minor  2) Anemia of Chronic Inflammation  Labs today: CBC-D, CRP, Iron, B12, Folate - Results pending - Will review and address adverse results as needed  Continue folic acid 1mg PO QD  RTC in 6 months via in-person visit - F/U sooner if needed/urgent    I had an extensive discussion with the patient regarding the diagnosis and discussed the plan of therapy, including general considerations regarding side effects and outcomes. Pt understood and gave appropriate teach back about the plan of care. All questions were answered to the patient's satisfaction. The patient is instructed to contact us at any time if questions or problems arise. Thank you for the opportunity to participate in the care of this very pleasant patient.    Total time = 30 minutes. 50% or more of this time was spent in counseling and/or coordination of care including reviewing medical history/radiology/labs, examining patient, formulating outlined plan with team, and discussing plan with patient/family.      Bran Dia PA-C "

## 2024-01-17 LAB — EPO SERPL-ACNC: 13 MU/ML (ref 4–27)

## 2024-01-19 DIAGNOSIS — B02.29 POST HERPETIC NEURALGIA: ICD-10-CM

## 2024-01-19 DIAGNOSIS — E87.1 HYPONATREMIA: ICD-10-CM

## 2024-01-19 RX ORDER — GABAPENTIN 100 MG/1
100 CAPSULE ORAL
Qty: 90 CAPSULE | Refills: 0 | Status: SHIPPED | OUTPATIENT
Start: 2024-01-19 | End: 2024-04-12 | Stop reason: SDUPTHER

## 2024-01-19 RX ORDER — SODIUM CHLORIDE 1000 MG
500 TABLET, SOLUBLE MISCELLANEOUS DAILY
Qty: 45 TABLET | Refills: 0 | Status: SHIPPED | OUTPATIENT
Start: 2024-01-19 | End: 2024-04-17 | Stop reason: SDUPTHER

## 2024-01-23 DIAGNOSIS — B02.29 POST HERPETIC NEURALGIA: ICD-10-CM

## 2024-01-23 DIAGNOSIS — F41.9 ANXIETY: ICD-10-CM

## 2024-01-23 RX ORDER — BUSPIRONE HYDROCHLORIDE 10 MG/1
10 TABLET ORAL DAILY PRN
Qty: 90 TABLET | Refills: 0 | OUTPATIENT
Start: 2024-01-23

## 2024-01-23 RX ORDER — GABAPENTIN 300 MG/1
300 CAPSULE ORAL
Qty: 90 CAPSULE | Refills: 3 | OUTPATIENT
Start: 2024-01-23

## 2024-02-12 ENCOUNTER — TELEPHONE (OUTPATIENT)
Dept: ADMISSION | Facility: HOSPITAL | Age: 84
End: 2024-02-12
Payer: MEDICARE

## 2024-02-12 DIAGNOSIS — D50.9 MICROCYTIC ANEMIA: ICD-10-CM

## 2024-02-12 RX ORDER — FOLIC ACID 1 MG/1
1 TABLET ORAL DAILY
Qty: 30 TABLET | Refills: 5 | Status: SHIPPED | OUTPATIENT
Start: 2024-02-12 | End: 2024-05-15 | Stop reason: SDUPTHER

## 2024-02-12 NOTE — TELEPHONE ENCOUNTER
[facility] faxed a request to the refill line for folic acid 1mg tablet daily.   Next MD FUV 07/18/24.

## 2024-02-26 ENCOUNTER — TELEPHONE (OUTPATIENT)
Dept: HEMATOLOGY/ONCOLOGY | Facility: HOSPITAL | Age: 84
End: 2024-02-26

## 2024-02-27 ENCOUNTER — HOSPITAL ENCOUNTER (OUTPATIENT)
Dept: RADIOLOGY | Facility: CLINIC | Age: 84
Discharge: HOME | End: 2024-02-27
Payer: MEDICARE

## 2024-02-27 DIAGNOSIS — Z12.31 ENCOUNTER FOR SCREENING MAMMOGRAM FOR BREAST CANCER: ICD-10-CM

## 2024-02-27 PROCEDURE — 77067 SCR MAMMO BI INCL CAD: CPT

## 2024-02-27 PROCEDURE — 77067 SCR MAMMO BI INCL CAD: CPT | Performed by: RADIOLOGY

## 2024-02-27 PROCEDURE — 77063 BREAST TOMOSYNTHESIS BI: CPT | Performed by: RADIOLOGY

## 2024-02-29 ENCOUNTER — OFFICE VISIT (OUTPATIENT)
Dept: ORTHOPEDIC SURGERY | Facility: HOSPITAL | Age: 84
End: 2024-02-29
Payer: MEDICARE

## 2024-02-29 DIAGNOSIS — M17.12 PRIMARY OSTEOARTHRITIS OF LEFT KNEE: Primary | ICD-10-CM

## 2024-02-29 PROCEDURE — 1159F MED LIST DOCD IN RCRD: CPT | Performed by: EMERGENCY MEDICINE

## 2024-02-29 PROCEDURE — 1160F RVW MEDS BY RX/DR IN RCRD: CPT | Performed by: EMERGENCY MEDICINE

## 2024-02-29 PROCEDURE — 20611 DRAIN/INJ JOINT/BURSA W/US: CPT | Performed by: EMERGENCY MEDICINE

## 2024-02-29 PROCEDURE — 1125F AMNT PAIN NOTED PAIN PRSNT: CPT | Performed by: EMERGENCY MEDICINE

## 2024-02-29 PROCEDURE — 1036F TOBACCO NON-USER: CPT | Performed by: EMERGENCY MEDICINE

## 2024-02-29 PROCEDURE — 2500000005 HC RX 250 GENERAL PHARMACY W/O HCPCS: Performed by: EMERGENCY MEDICINE

## 2024-02-29 PROCEDURE — 99213 OFFICE O/P EST LOW 20 MIN: CPT | Performed by: EMERGENCY MEDICINE

## 2024-02-29 PROCEDURE — 2500000004 HC RX 250 GENERAL PHARMACY W/ HCPCS (ALT 636 FOR OP/ED): Performed by: EMERGENCY MEDICINE

## 2024-02-29 RX ORDER — TRIAMCINOLONE ACETONIDE 40 MG/ML
80 INJECTION, SUSPENSION INTRA-ARTICULAR; INTRAMUSCULAR
Status: COMPLETED | OUTPATIENT
Start: 2024-02-29 | End: 2024-02-29

## 2024-02-29 RX ORDER — LIDOCAINE HYDROCHLORIDE 10 MG/ML
4 INJECTION INFILTRATION; PERINEURAL
Status: COMPLETED | OUTPATIENT
Start: 2024-02-29 | End: 2024-02-29

## 2024-02-29 RX ADMIN — LIDOCAINE HYDROCHLORIDE 4 ML: 10 INJECTION, SOLUTION INFILTRATION; PERINEURAL at 11:04

## 2024-02-29 RX ADMIN — TRIAMCINOLONE ACETONIDE 80 MG: 200 INJECTION, SUSPENSION INTRA-ARTICULAR; INTRAMUSCULAR at 11:04

## 2024-02-29 ASSESSMENT — ENCOUNTER SYMPTOMS: KNEE DEFORMITY: 1

## 2024-02-29 NOTE — PROGRESS NOTES
Subjective   Nava Gutierrez is a 83 y.o. female who presents for Follow-up of the Left Knee (DOLI: 12/7/23/)    Left Knee       2/29/24: Patient returns today for left knee pain.  We did perform a left knee corticosteroid injection for her on 12/7/2023.  She felt that it worked quite well and would like to have a repeat injection today.  No additional complaints.    12/7/23: Patient turns today for left knee pain.  She has known left knee DJD and we did perform a left knee aspiration and injection on 9/14/2023.  She felt this worked quite well for her and would like to have a repeat injection performed today.  No additional complaints this time.    9/14/23: Patient returns today for left knee pain. We did perform a left knee aspiration and injection on 6/15/2023. She felt this worked quite well for her up until recently. She like to have a repeat injection performed today. She denies any further injuries. She has no additional complaints at this time.    6/15/23: Patient turns today for left knee pain. We performed a left knee aspiration and injection on 3/16/2023 that worked quite well for her. She presents today for repeat injection. She denies any additional injuries. She has no additional complaints today.    3/16/23: Patient returns today for left knee pain. She states previous injection she received on 12/15/2022 worked quite well until recently. She presents today requesting a repeat injection. She denies any further injuries. She has no additional complaints at this time.    12/15/22: Patient returns today for left knee pain. She states the preinjection she received on 9/22/2022 worked quite well up until recently. She presents today requesting repeat injection. She denies any further injuries. She has no additional complaints at this time.    9/22/22: 81-year-old female present known to me is presenting with complaint of chronic left knee pain. She has known moderate to severe left knee arthritis. She did  have a knee aspiration and injection by Dr. Joseph on 5/18/2022 that worked quite well for her up until recently. She presents today requesting a repeat injection. She denies any interval injury. She describes the pain as deep and throbbing. She has no additional complaints today.     ROS: All pertinent positive symptoms are included in the history of present illness.    All other systems have been reviewed and are negative and noncontributory to this patient's current ailments.    Objective     There were no vitals filed for this visit.    Physical Exam  General/Constitutional: No apparent distress. Well-nourished and well developed.  Head: Normocephalic, Atraumatic.   Eyes: EOMI.  Vascular: No edema, swelling or tenderness, except as noted in detailed exam.  Respiratory: Non-labored breathing.  Integumentary: No impressive skin lesions present, except as noted in detailed exam.  Neurological: Oriented to person, place, and time.  Psychological: Normal mood and affect.  Musculoskeletal: Normal, except as noted in detailed exam.  Left knee  Flexion 120. Extension 5. Varus deformity present. Crepitus present with knee flexion/extension. Positive joint hypertrophy. Grade 2 effusion. No ecchymosis. Muscle strength 5 out of 5 with flexion and extension. Lachman negative. Anterior drawer negative. Posterior drawer negative. Valgus stress negative. Varus stress negative. Kamla positive.     Patient ID: Nava Gutierrez is a 83 y.o. female.    L Inj/Asp: L knee on 2/29/2024 11:04 AM  Indications: pain and joint swelling  Details: 18 G needle, ultrasound-guided superolateral approach  Medications: 80 mg triamcinolone acetonide 40 mg/mL; 4 mL lidocaine 10 mg/mL (1 %)  Aspirate: 10 mL clear and yellow  Outcome: tolerated well, no immediate complications  Procedure, treatment alternatives, risks and benefits explained, specific risks discussed. Consent was given by the patient.             Assessment/Plan   Problem List  Items Addressed This Visit       Primary osteoarthritis of left knee    Relevant Orders    Point of Care Ultrasound (Completed)     Considering that she is done well with previous corticosteroid injections, I feel that a repeat injection is warranted. Discussed risks versus benefits of having injection performed. Patient has elected to proceed with procedure. Please refer to procedure note above. Discussed with patient to limit weightbearing activities over the next 24-48 hours, they can then progress to full activities as tolerated. Discussed with patient to avoid water submersion over the next 2 days. Discussed with patient to call me immediately if they develop worsening pain, rash, erythema, or fevers. Patient should follow-up as needed if pain persists or worsens.    Boom Ponce, DO  Sports Medicine  [facility]     ** Please excuse any errors in grammar or translation related to this dictation. Voice recognition software was utilized to prepare this document. **

## 2024-03-12 DIAGNOSIS — B02.29 POST HERPETIC NEURALGIA: ICD-10-CM

## 2024-03-12 RX ORDER — GABAPENTIN 300 MG/1
300 CAPSULE ORAL NIGHTLY
Qty: 100 CAPSULE | Refills: 0 | Status: SHIPPED | OUTPATIENT
Start: 2024-03-12 | End: 2024-04-12 | Stop reason: SDUPTHER

## 2024-03-31 DIAGNOSIS — G25.0 BENIGN FAMILIAL TREMOR: ICD-10-CM

## 2024-04-01 RX ORDER — PROPRANOLOL HYDROCHLORIDE 60 MG/1
60 CAPSULE, EXTENDED RELEASE ORAL DAILY
Qty: 90 CAPSULE | Refills: 0 | Status: SHIPPED | OUTPATIENT
Start: 2024-04-01

## 2024-04-07 DIAGNOSIS — E78.00 PURE HYPERCHOLESTEROLEMIA: ICD-10-CM

## 2024-04-07 DIAGNOSIS — I10 PRIMARY HYPERTENSION: ICD-10-CM

## 2024-04-08 RX ORDER — PRAVASTATIN SODIUM 20 MG/1
20 TABLET ORAL DAILY
Qty: 90 TABLET | Refills: 3 | OUTPATIENT
Start: 2024-04-08

## 2024-04-08 RX ORDER — NIFEDIPINE 90 MG/1
90 TABLET, EXTENDED RELEASE ORAL DAILY
Qty: 90 TABLET | Refills: 3 | OUTPATIENT
Start: 2024-04-08

## 2024-04-10 ENCOUNTER — APPOINTMENT (OUTPATIENT)
Dept: PRIMARY CARE | Facility: CLINIC | Age: 84
End: 2024-04-10
Payer: MEDICARE

## 2024-04-12 ENCOUNTER — OFFICE VISIT (OUTPATIENT)
Dept: PRIMARY CARE | Facility: CLINIC | Age: 84
End: 2024-04-12
Payer: MEDICARE

## 2024-04-12 VITALS
HEART RATE: 77 BPM | BODY MASS INDEX: 28.43 KG/M2 | DIASTOLIC BLOOD PRESSURE: 79 MMHG | HEIGHT: 59 IN | OXYGEN SATURATION: 94 % | SYSTOLIC BLOOD PRESSURE: 155 MMHG | WEIGHT: 141 LBS

## 2024-04-12 DIAGNOSIS — I10 PRIMARY HYPERTENSION: ICD-10-CM

## 2024-04-12 DIAGNOSIS — M81.0 AGE-RELATED OSTEOPOROSIS WITHOUT CURRENT PATHOLOGICAL FRACTURE: ICD-10-CM

## 2024-04-12 DIAGNOSIS — E78.00 PURE HYPERCHOLESTEROLEMIA: Primary | ICD-10-CM

## 2024-04-12 DIAGNOSIS — I10 HTN (HYPERTENSION), BENIGN: ICD-10-CM

## 2024-04-12 DIAGNOSIS — G25.0 BENIGN FAMILIAL TREMOR: ICD-10-CM

## 2024-04-12 DIAGNOSIS — E55.9 VITAMIN D DEFICIENCY: ICD-10-CM

## 2024-04-12 DIAGNOSIS — M17.12 PRIMARY OSTEOARTHRITIS OF LEFT KNEE: ICD-10-CM

## 2024-04-12 DIAGNOSIS — Z71.89 ADVANCE CARE PLANNING: ICD-10-CM

## 2024-04-12 DIAGNOSIS — B02.29 POST HERPETIC NEURALGIA: ICD-10-CM

## 2024-04-12 DIAGNOSIS — R94.6 ABNORMAL THYROID EXAM: ICD-10-CM

## 2024-04-12 DIAGNOSIS — R73.03 PRE-DIABETES: ICD-10-CM

## 2024-04-12 PROCEDURE — 3078F DIAST BP <80 MM HG: CPT | Performed by: FAMILY MEDICINE

## 2024-04-12 PROCEDURE — 1036F TOBACCO NON-USER: CPT | Performed by: FAMILY MEDICINE

## 2024-04-12 PROCEDURE — 99214 OFFICE O/P EST MOD 30 MIN: CPT | Performed by: FAMILY MEDICINE

## 2024-04-12 PROCEDURE — 1159F MED LIST DOCD IN RCRD: CPT | Performed by: FAMILY MEDICINE

## 2024-04-12 PROCEDURE — 3077F SYST BP >= 140 MM HG: CPT | Performed by: FAMILY MEDICINE

## 2024-04-12 PROCEDURE — G2211 COMPLEX E/M VISIT ADD ON: HCPCS | Performed by: FAMILY MEDICINE

## 2024-04-12 PROCEDURE — 1160F RVW MEDS BY RX/DR IN RCRD: CPT | Performed by: FAMILY MEDICINE

## 2024-04-12 RX ORDER — GABAPENTIN 100 MG/1
100 CAPSULE ORAL
Qty: 90 CAPSULE | Refills: 0 | Status: SHIPPED | OUTPATIENT
Start: 2024-04-12

## 2024-04-12 RX ORDER — GABAPENTIN 300 MG/1
300 CAPSULE ORAL NIGHTLY
Qty: 100 CAPSULE | Refills: 0 | Status: SHIPPED | OUTPATIENT
Start: 2024-04-12

## 2024-04-12 NOTE — ACP (ADVANCE CARE PLANNING)
Confirming Previous Code Status:   Advance Care Planning Note     Discussion Date: 04/12/24   Discussion Participants: patient    The patient wishes to discuss Advance Care Planning today and the following is a brief summary of our discussion.     Patient has capacity to make their own medical decisions: Yes  Health Care Agent/Surrogate Decision Maker documented in chart: Yes    Documents on file and valid:  Advance Directive/Living Will:  working on it    Health Care Power of :  working on it  Other: DNR    Communication of Medical Status/Prognosis:   YEs     Communication of Treatment Goals/Options:   Yes     Treatment Decisions  Goals of Care: comfort is paramount; other goals are not relevant or achievable     Follow Up Plan  none  Team Members  none  Time Statement: Total face to face time spent on advance care planning was 10 minutes with 10 minutes spent in counseling, including the explanation.    Terra Gray MD  4/12/2024 1:39 PM

## 2024-04-12 NOTE — PROGRESS NOTES
"Subjective   Patient ID: Nava Gutierrez is a 83 y.o. female who presents for Follow-up (3 Month).    HPI Patient is doing well. Has no complains. Wants to sign a DNR as she feels she does not want to ever be on a ventilator.     Review of Systems  Constitutional: No fever or chills  Cardiovascular: no chest pain, no palpitations and no syncope.   Respiratory: no cough, no shortness of breath during exertion and no shortness of breath at rest.   Gastrointestinal: no abdominal pain, no nausea and no vomiting.  Neuro: No Headache, no dizziness    Objective   /79   Pulse 77   Ht 1.499 m (4' 11\")   Wt 64 kg (141 lb)   SpO2 94%   BMI 28.48 kg/m²     Physical Exam  Constitutional: Alert and in no acute distress. Well developed, well nourished  Head and Face: Head and face: Normal.    Cardiovascular: Heart rate and rhythm were normal, normal S1 and S2. No peripheral edema.   Pulmonary: No respiratory distress. Clear bilateral breath sounds.  Musculoskeletal: Gait and station: Normal. Muscle strength/tone: Normal.   Skin: Normal skin color and pigmentation, normal skin turgor, and no rash.    Psychiatric: Judgment and insight: Intact. Mood and affect: Normal.    Lab Results   Component Value Date    WBC 6.8 01/11/2024    HGB 9.5 (L) 01/11/2024    HCT 30.8 (L) 01/11/2024     01/11/2024    CHOL 184 06/15/2023    TRIG 199 (H) 06/15/2023    HDL 47.9 06/15/2023    ALT 13 01/11/2024    AST 18 01/11/2024     (L) 01/11/2024    K 5.1 01/11/2024    CL 98 01/11/2024    CREATININE 0.83 01/11/2024    BUN 13 01/11/2024    CO2 28 01/11/2024    TSH 3.37 06/09/2023    INR 1.1 01/31/2018    HGBA1C 5.9 (A) 06/09/2023       BI mammo bilateral screening tomosynthesis  Narrative: Interpreted By:  Pearl Cohn,   STUDY:  BI MAMMO BILATERAL SCREENING TOMOSYNTHESIS;  2/27/2024 10:10 am      ACCESSION NUMBER(S):  [id number]      ORDERING CLINICIAN:  SELF REFERRAL      INDICATION:  Screening.  History of benign left breast " biopsy.      COMPARISON:  12/05/2022 12/03/2021      FINDINGS:  2D and tomosynthesis images were reviewed at 1 mm slice thickness.      Density:  There are areas of scattered fibroglandular tissue.      No suspicious masses or calcifications are identified.      Impression: No mammographic evidence of malignancy.          BI-RADS CATEGORY:  BI-RADS Category:  1 Negative.  Recommendation:  Routine Screening Mammogram in 1 Year.  Recommended Date:  1 Year.  Laterality:  Bilateral.      For any future breast imaging appointments, please call [phone]  ([phone]).          MACRO:  None      Signed by: Pearl Cohn 2/27/2024 4:02 PM  Dictation workstation:   [id number]      Assessment/Plan   Diagnoses and all orders for this visit:  Pure hypercholesterolemia  -     Lipid Panel; Future  Primary hypertension  -     Follow Up In [facility] - PCP - Established  Post herpetic neuralgia  -     gabapentin (Neurontin) 100 mg capsule; Take 1 capsule (100 mg) by mouth once daily with breakfast.  -     gabapentin (Neurontin) 300 mg capsule; Take 1 capsule (300 mg) by mouth once daily at bedtime.  HTN (hypertension), benign  -     Follow Up In [facility] - PCP - Medicare Annual; Future  -     CBC; Future  -     Comprehensive Metabolic Panel; Future  -     Albumin , Urine Random; Future  Vitamin D deficiency  -     Vitamin B12; Future  -     Vitamin D 25-Hydroxy,Total (for eval of Vitamin D levels); Future  Pre-diabetes  -     Hemoglobin A1C; Future  Abnormal thyroid exam  -     TSH with reflex to Free T4 if abnormal; Future  Age-related osteoporosis without current pathological fracture  -     Walker with Seat  Benign familial tremor  -     Walker with Seat  Primary osteoarthritis of left knee  -     Walker with Seat  Advance care planning  -     DNR Comfort Measures Only        Dear Nava Gutierrez     It was my pleasure to take care of you today in the office. Below are the things we discussed today:    1.  Hypertension: Continue nifedipine and valsartan.     2. GERD: Continue omeprazole.      3. Hyperlipidemia: Continue pravastatin.     4. Post hepatic neuralgia pain: Take 100 mg gabapentin in the AM and 300 mg at night.     5.  Anxiety: Restart Buspar, take once daily as needed.     6. Asthma: Continue Flovent and Albuterol as needed.     7. Essential tremors: Continue Primidone, Topamax and Propanolol.     8. Hyponatremia: Continue sodium tablets.     9. Osteoarthritis of the left knee: Disability placard given.          Your yearly Physical is due in: July 2024   When you call the office for your yearly Physical, please ask them to inform me to order your blood work, so that you can get the fasting blood work before your appointment and we can discuss the results at your physical.      Please call me if any questions arise from now until your next visit. I will call you after I am done seeing patients. A Doctor is always available by phone when the office is closed. Please feel free to call for help with any problem that you feel shouldn't wait until the office re-opens.      Libtayo Counseling- I discussed with the patient the risks of Libtayo including but not limited to nausea, vomiting, diarrhea, and bone or muscle pain.  The patient verbalized understanding of the proper use and possible adverse effects of Libtayo.  All of the patient's questions and concerns were addressed.

## 2024-04-14 DIAGNOSIS — G25.0 BENIGN FAMILIAL TREMOR: ICD-10-CM

## 2024-04-15 RX ORDER — TOPIRAMATE 50 MG/1
50 TABLET, FILM COATED ORAL 2 TIMES DAILY
Qty: 200 TABLET | Refills: 0 | Status: SHIPPED | OUTPATIENT
Start: 2024-04-15 | End: 2024-06-10 | Stop reason: SDUPTHER

## 2024-04-17 DIAGNOSIS — E87.1 HYPONATREMIA: ICD-10-CM

## 2024-04-17 RX ORDER — SODIUM CHLORIDE 1000 MG
500 TABLET, SOLUBLE MISCELLANEOUS DAILY
Qty: 45 TABLET | Refills: 1 | Status: SHIPPED | OUTPATIENT
Start: 2024-04-17

## 2024-05-07 DIAGNOSIS — K21.9 GASTROESOPHAGEAL REFLUX DISEASE WITHOUT ESOPHAGITIS: ICD-10-CM

## 2024-05-07 DIAGNOSIS — I10 PRIMARY HYPERTENSION: ICD-10-CM

## 2024-05-08 RX ORDER — VALSARTAN 320 MG/1
320 TABLET ORAL DAILY
Qty: 100 TABLET | Refills: 0 | Status: SHIPPED | OUTPATIENT
Start: 2024-05-08

## 2024-05-08 RX ORDER — OMEPRAZOLE 40 MG/1
CAPSULE, DELAYED RELEASE ORAL
Qty: 100 CAPSULE | Refills: 0 | Status: SHIPPED | OUTPATIENT
Start: 2024-05-08

## 2024-05-15 ENCOUNTER — TELEPHONE (OUTPATIENT)
Dept: HEMATOLOGY/ONCOLOGY | Facility: CLINIC | Age: 84
End: 2024-05-15
Payer: MEDICARE

## 2024-05-15 DIAGNOSIS — D50.9 MICROCYTIC ANEMIA: ICD-10-CM

## 2024-05-15 DIAGNOSIS — D50.9 MICROCYTIC ANEMIA: Primary | ICD-10-CM

## 2024-05-15 RX ORDER — FOLIC ACID 1 MG/1
1 TABLET ORAL DAILY
Qty: 90 TABLET | Refills: 3 | Status: SHIPPED | OUTPATIENT
Start: 2024-05-15

## 2024-05-15 RX ORDER — FOLIC ACID 1 MG/1
1 TABLET ORAL DAILY
Qty: 30 TABLET | Refills: 11 | Status: SHIPPED | OUTPATIENT
Start: 2024-05-15 | End: 2024-05-15 | Stop reason: SDUPTHER

## 2024-05-15 NOTE — TELEPHONE ENCOUNTER
Called patient to let her know her prescription for folic acid was sent over by Bran Dia PA-C to her pharmacy and she can . Patient agreeable.  No further needs at this time.

## 2024-05-23 ENCOUNTER — TELEPHONE (OUTPATIENT)
Dept: NEUROLOGY | Facility: HOSPITAL | Age: 84
End: 2024-05-23
Payer: MEDICARE

## 2024-05-23 ENCOUNTER — APPOINTMENT (OUTPATIENT)
Dept: ORTHOPEDIC SURGERY | Facility: HOSPITAL | Age: 84
End: 2024-05-23
Payer: MEDICARE

## 2024-05-23 DIAGNOSIS — G25.0 BENIGN FAMILIAL TREMOR: Primary | ICD-10-CM

## 2024-05-23 NOTE — TELEPHONE ENCOUNTER
Nava Gutierrez called.  She would like to request a refill of Primidone 250mg 1 BID #180 with 3 refills sent to [facility] mail order pharmacy on file. I scheduled her an appt with you for 06/10/24

## 2024-05-24 RX ORDER — PRIMIDONE 250 MG/1
250 TABLET ORAL 2 TIMES DAILY
Qty: 180 TABLET | Refills: 3 | Status: SHIPPED | OUTPATIENT
Start: 2024-05-24

## 2024-06-03 ENCOUNTER — APPOINTMENT (OUTPATIENT)
Dept: ORTHOPEDIC SURGERY | Facility: HOSPITAL | Age: 84
End: 2024-06-03
Payer: MEDICARE

## 2024-06-07 NOTE — PROGRESS NOTES
"Subjective     Nava Gutierrez is a 83 y.o. year old female who presents with No chief complaint on file., here for follow up visit.    HPI    ET, last visit with me 9/2023. Topamax decreased d/t sedation and that resolved with lower dose.    Tremor unchanged. Not bothersome when asked about interference w/ ADLs, says she does not let it bother her, is used to it.  Balance: \"OK\", cane PRN when out of the home  Falls: None    She had to send the [employer] back d/t cost. Helped some.     Denies new medical issues except shingles in 10/2023 for which she is on gabapentin had dental implants 3 weeks ago.    Folate wnl 1/15/24  Liver enzymes wnl 1/11/24  CBC 1/11/24 w/ decrease H&H, MCH and MCHC, elevated RDW. The platelets, RBC and WBC wnl      Current ET Meds:  Primidone 250mg 2 times a day (higher doses caused sedation)  Propranolol ER 60mg daily ((PCP prescribes))  Topiramate 50mg 1 tab 2 times a day  SE: None currently      Past ET Meds:  Gabapentin- significant fatigue but on currently for hx shingles      Patient Health Questionnaire-2 Score: 0            Current Outpatient Medications:     albuterol 2.5 mg /3 mL (0.083 %) nebulizer solution, USE 1 UNIT DOSE EVERY 4-6 HOURS AS NEEDED FOR WHEEZING ., Disp: , Rfl:     busPIRone (Buspar) 10 mg tablet, Take 1 tablet (10 mg) by mouth once daily as needed (anxiety)., Disp: 90 tablet, Rfl: 0    CALCIUM ORAL, Calcium TABS, Disp: , Rfl:     cholecalciferol (Vitamin D-3) 50 mcg (2,000 unit) capsule, take 1 capsule by mouth once daily, Disp: , Rfl:     fluticasone (Flovent) 110 mcg/actuation inhaler, INHALE 2 PUFFS BY MOUTH TWICE A DAY, Disp: , Rfl:     folic acid (Folvite) 1 mg tablet, Take 1 tablet (1 mg) by mouth once daily., Disp: 90 tablet, Rfl: 3    gabapentin (Neurontin) 100 mg capsule, Take 1 capsule (100 mg) by mouth once daily with breakfast., Disp: 90 capsule, Rfl: 0    gabapentin (Neurontin) 300 mg capsule, Take 1 capsule (300 mg) by mouth once daily at bedtime., " Disp: 100 capsule, Rfl: 0    latanoprost (Xalatan) 0.005 % ophthalmic solution, , Disp: , Rfl:     magnesium oxide 400 mg magnesium capsule, TAKE 1 CAPSULE Daily, Disp: , Rfl:     NIFEdipine ER (NIFEdipine XL) 90 mg 24 hr tablet, Take 1 tablet (90 mg) by mouth once daily. Do not crush, chew, or split., Disp: 90 tablet, Rfl: 0    omeprazole (PriLOSEC) 40 mg DR capsule, TAKE 1 CAPSULE BY MOUTH DAILY IN THE MORNING BEFORE A MEAL, Disp: 100 capsule, Rfl: 0    pravastatin (Pravachol) 20 mg tablet, Take 1 tablet (20 mg) by mouth once daily., Disp: 90 tablet, Rfl: 0    primidone (Mysoline) 250 mg tablet, Take 1 tablet (250 mg) by mouth 2 times a day., Disp: 180 tablet, Rfl: 3    propranolol LA (Inderal LA) 60 mg 24 hr capsule, Take 1 capsule (60 mg) by mouth once daily., Disp: 90 capsule, Rfl: 0    sodium chloride 1,000 mg tablet, Take 0.5 tablets (0.5 g) by mouth once daily., Disp: 45 tablet, Rfl: 1    topiramate (Topamax) 50 mg tablet, Take 1 tablet (50 mg) by mouth 2 times a day., Disp: 200 tablet, Rfl: 0    valsartan (Diovan) 320 mg tablet, Take 1 tablet (320 mg) by mouth once daily., Disp: 100 tablet, Rfl: 0       Objective   Vitals:    06/10/24 1033   BP: 180/74   BP Location: Right arm   Patient Position: Sitting   BP Cuff Size: Adult   Pulse: 69   Resp: 18   Weight: 62.1 kg (137 lb)                 Physical Exam    No bradykinesia on finger taps,hand opening, toe taps or heel stomps  No rigidity  No postural instability  Gait is normal without cane, stand is normal, slightly stooped posture                Assessment/Plan   Ms. Nava Gutierrez is a 83 y.o. F  with ET (at least 2013 from our EMR) with severe tremors involving face, head, arms. Previously negative Radha scan. Last visit Topamax decreased d/t SE of fatigue and now tolerating dose well. Of note, medication titration is limited by sedation for primidone and gabapentin, propranolol titration is limited by COPD/asthma, benzodiazepines are not indicated due to  age and sedation.    She is not interested in DBS or MRIgFUS.     Calatrio watch was slightly helpful and too expensive so she no longer has it.     Pending CMP and CBC in July.     An MD in the ER (when she had shingles) and others have commented she has Parkinson's and this was concerning to her and on exam no s/sx of parkinsonism.     Diagnoses and all orders for this visit:  Benign familial tremor    #No signs of Parkinson's on exam today, we can continue to monitor    #Continue medications unchanged     Primidone 250mg 2 times a day  Topiramate 50mg 1 tab 2 times a day    #Follow up in 6 months with Dr. Bunny Winters, NP-C  Adult/Gerontological Nurse Practitioner   [facility], Department of Neurology  [facility] [place]  [facility]  [address] [place] [address]  Phone: [phone]  Fax: [phone]

## 2024-06-10 ENCOUNTER — OFFICE VISIT (OUTPATIENT)
Dept: NEUROLOGY | Facility: CLINIC | Age: 84
End: 2024-06-10
Payer: MEDICARE

## 2024-06-10 VITALS
SYSTOLIC BLOOD PRESSURE: 180 MMHG | HEART RATE: 69 BPM | WEIGHT: 137 LBS | DIASTOLIC BLOOD PRESSURE: 74 MMHG | RESPIRATION RATE: 18 BRPM | BODY MASS INDEX: 27.67 KG/M2

## 2024-06-10 DIAGNOSIS — G25.0 BENIGN FAMILIAL TREMOR: ICD-10-CM

## 2024-06-10 PROCEDURE — 1036F TOBACCO NON-USER: CPT | Performed by: NURSE PRACTITIONER

## 2024-06-10 PROCEDURE — 99213 OFFICE O/P EST LOW 20 MIN: CPT | Performed by: NURSE PRACTITIONER

## 2024-06-10 PROCEDURE — 1159F MED LIST DOCD IN RCRD: CPT | Performed by: NURSE PRACTITIONER

## 2024-06-10 PROCEDURE — 3077F SYST BP >= 140 MM HG: CPT | Performed by: NURSE PRACTITIONER

## 2024-06-10 PROCEDURE — 3078F DIAST BP <80 MM HG: CPT | Performed by: NURSE PRACTITIONER

## 2024-06-10 PROCEDURE — 1160F RVW MEDS BY RX/DR IN RCRD: CPT | Performed by: NURSE PRACTITIONER

## 2024-06-10 RX ORDER — TOPIRAMATE 50 MG/1
50 TABLET, FILM COATED ORAL 2 TIMES DAILY
Qty: 180 TABLET | Refills: 3 | Status: SHIPPED | OUTPATIENT
Start: 2024-06-10

## 2024-06-10 ASSESSMENT — FAHN TOLOSA MARTIN TREMOR (FTM)
TOUNGE WHEN PROTUDED: 0
TOUNGE TOTAL SCORE: 0
VOICE TOTAL SCORE: 1
DRAWING A TOTAL SCORE: 5
HEAD IN REPOSE: 1
TRUNK IN REPOSE: 0
DRAWING A RIGHT SCORE: 2
UE FINGER TO NOSE AND OTHER ACTIONS: 2
TOUNGE AT REST: 0
LE AT REST: 0
UE FINGER TO NOSE AND OTHER ACTIONS: 1
UE ARM AT REST: 1
UE ARMS OUTSTRECHED WRISTS MILDLY EXTENDED FINGERS SPREAD APART: 1
UE ARM AT REST: 0
RUE TOTAL SCORE: 2
LE LEG FLEXED AT HIPS AND KNEES AT POSTURE: 0
UE ARMS OUTSTRECHED WRISTS MILDLY EXTENDED FINGERS SPREAD APART: 1
RUE TOTAL SCORE: 4
FACE IN REPOSE: 1
HANDWRITING WITH DOMINANT HAND: 3
DRAWING A LEFT SCORE: 3
LE LEG FLEXED AT HIPS AND KNEES AT POSTURE: 0
LE AT REST: 0
VOICE IN ACTION: 1

## 2024-06-10 ASSESSMENT — ENCOUNTER SYMPTOMS
OCCASIONAL FEELINGS OF UNSTEADINESS: 1
DEPRESSION: 0
LOSS OF SENSATION IN FEET: 0

## 2024-06-10 NOTE — PATIENT INSTRUCTIONS
#No signs of Parkinson's on exam today, we can continue to monitor    #Continue medications unchanged     Primidone 250mg 2 times a day  Topiramate 50mg 1 tab 2 times a day    #Follow up in 6 months with Dr. Bunny Winters, NP-C  Adult/Gerontological Nurse Practitioner   [facility], Department of Neurology  [facility] [place]  [facility]  [address] [id number]  Phone: [phone]  Fax: [phone]

## 2024-07-08 ENCOUNTER — LAB (OUTPATIENT)
Dept: LAB | Facility: LAB | Age: 84
End: 2024-07-08
Payer: MEDICARE

## 2024-07-08 DIAGNOSIS — E78.00 PURE HYPERCHOLESTEROLEMIA: ICD-10-CM

## 2024-07-08 DIAGNOSIS — I10 HTN (HYPERTENSION), BENIGN: ICD-10-CM

## 2024-07-08 DIAGNOSIS — R73.03 PRE-DIABETES: ICD-10-CM

## 2024-07-08 DIAGNOSIS — R94.6 ABNORMAL THYROID EXAM: ICD-10-CM

## 2024-07-08 DIAGNOSIS — E55.9 VITAMIN D DEFICIENCY: ICD-10-CM

## 2024-07-08 LAB
25(OH)D3 SERPL-MCNC: 47 NG/ML (ref 30–100)
ALBUMIN SERPL BCP-MCNC: 4.3 G/DL (ref 3.4–5)
ALP SERPL-CCNC: 87 U/L (ref 33–136)
ALT SERPL W P-5'-P-CCNC: 10 U/L (ref 7–45)
ANION GAP SERPL CALC-SCNC: 10 MMOL/L (ref 10–20)
AST SERPL W P-5'-P-CCNC: 17 U/L (ref 9–39)
BILIRUB SERPL-MCNC: 0.3 MG/DL (ref 0–1.2)
BUN SERPL-MCNC: 19 MG/DL (ref 6–23)
CALCIUM SERPL-MCNC: 9.1 MG/DL (ref 8.6–10.3)
CHLORIDE SERPL-SCNC: 98 MMOL/L (ref 98–107)
CHOLEST SERPL-MCNC: 199 MG/DL (ref 0–199)
CHOLESTEROL/HDL RATIO: 4.7
CO2 SERPL-SCNC: 29 MMOL/L (ref 21–32)
CREAT SERPL-MCNC: 0.88 MG/DL (ref 0.5–1.05)
EGFRCR SERPLBLD CKD-EPI 2021: 66 ML/MIN/1.73M*2
ERYTHROCYTE [DISTWIDTH] IN BLOOD BY AUTOMATED COUNT: 16 % (ref 11.5–14.5)
EST. AVERAGE GLUCOSE BLD GHB EST-MCNC: 123 MG/DL
GLUCOSE SERPL-MCNC: 102 MG/DL (ref 74–99)
HBA1C MFR BLD: 5.9 %
HCT VFR BLD AUTO: 31.9 % (ref 36–46)
HDLC SERPL-MCNC: 42.7 MG/DL
HGB BLD-MCNC: 9.6 G/DL (ref 12–16)
LDLC SERPL CALC-MCNC: 95 MG/DL
MCH RBC QN AUTO: 20.3 PG (ref 26–34)
MCHC RBC AUTO-ENTMCNC: 30.1 G/DL (ref 32–36)
MCV RBC AUTO: 68 FL (ref 80–100)
NON HDL CHOLESTEROL: 156 MG/DL (ref 0–149)
NRBC BLD-RTO: 0 /100 WBCS (ref 0–0)
PLATELET # BLD AUTO: 183 X10*3/UL (ref 150–450)
POTASSIUM SERPL-SCNC: 4.5 MMOL/L (ref 3.5–5.3)
PROT SERPL-MCNC: 7.6 G/DL (ref 6.4–8.2)
RBC # BLD AUTO: 4.72 X10*6/UL (ref 4–5.2)
SODIUM SERPL-SCNC: 132 MMOL/L (ref 136–145)
TRIGL SERPL-MCNC: 308 MG/DL (ref 0–149)
TSH SERPL-ACNC: 3.78 MIU/L (ref 0.44–3.98)
VIT B12 SERPL-MCNC: 473 PG/ML (ref 211–911)
VLDL: 62 MG/DL (ref 0–40)
WBC # BLD AUTO: 5.5 X10*3/UL (ref 4.4–11.3)

## 2024-07-08 PROCEDURE — 82607 VITAMIN B-12: CPT

## 2024-07-08 PROCEDURE — 84443 ASSAY THYROID STIM HORMONE: CPT

## 2024-07-08 PROCEDURE — 85027 COMPLETE CBC AUTOMATED: CPT

## 2024-07-08 PROCEDURE — 82570 ASSAY OF URINE CREATININE: CPT

## 2024-07-08 PROCEDURE — 36415 COLL VENOUS BLD VENIPUNCTURE: CPT

## 2024-07-08 PROCEDURE — 82043 UR ALBUMIN QUANTITATIVE: CPT

## 2024-07-08 PROCEDURE — 83036 HEMOGLOBIN GLYCOSYLATED A1C: CPT

## 2024-07-08 PROCEDURE — 80053 COMPREHEN METABOLIC PANEL: CPT

## 2024-07-08 PROCEDURE — 80061 LIPID PANEL: CPT

## 2024-07-08 PROCEDURE — 82306 VITAMIN D 25 HYDROXY: CPT

## 2024-07-09 LAB
CREAT UR-MCNC: 68.8 MG/DL (ref 20–320)
MICROALBUMIN UR-MCNC: 26.6 MG/L
MICROALBUMIN/CREAT UR: 38.7 UG/MG CREAT

## 2024-07-18 ENCOUNTER — LAB (OUTPATIENT)
Dept: LAB | Facility: HOSPITAL | Age: 84
End: 2024-07-18
Payer: MEDICARE

## 2024-07-18 ENCOUNTER — OFFICE VISIT (OUTPATIENT)
Dept: HEMATOLOGY/ONCOLOGY | Facility: HOSPITAL | Age: 84
End: 2024-07-18
Payer: MEDICARE

## 2024-07-18 VITALS
OXYGEN SATURATION: 97 % | TEMPERATURE: 98.2 F | HEART RATE: 72 BPM | DIASTOLIC BLOOD PRESSURE: 62 MMHG | RESPIRATION RATE: 16 BRPM | WEIGHT: 139.2 LBS | SYSTOLIC BLOOD PRESSURE: 156 MMHG | HEIGHT: 58 IN | BODY MASS INDEX: 29.22 KG/M2

## 2024-07-18 DIAGNOSIS — D63.8 ANEMIA OF CHRONIC DISEASE: Chronic | ICD-10-CM

## 2024-07-18 DIAGNOSIS — D63.8 ANEMIA OF CHRONIC DISEASE: Primary | Chronic | ICD-10-CM

## 2024-07-18 DIAGNOSIS — D63.1 ANEMIA OF CHRONIC RENAL FAILURE, UNSPECIFIED CKD STAGE: ICD-10-CM

## 2024-07-18 DIAGNOSIS — D64.9 ANEMIA, UNSPECIFIED TYPE: Primary | ICD-10-CM

## 2024-07-18 DIAGNOSIS — N18.9 ANEMIA OF CHRONIC RENAL FAILURE, UNSPECIFIED CKD STAGE: ICD-10-CM

## 2024-07-18 DIAGNOSIS — D56.3 ALPHA THALASSEMIA TRAIT: Chronic | ICD-10-CM

## 2024-07-18 LAB
BASOPHILS # BLD AUTO: 0.04 X10*3/UL (ref 0–0.1)
BASOPHILS NFR BLD AUTO: 0.8 %
CRP SERPL-MCNC: 2.26 MG/DL
EOSINOPHIL # BLD AUTO: 0.13 X10*3/UL (ref 0–0.4)
EOSINOPHIL NFR BLD AUTO: 2.6 %
ERYTHROCYTE [DISTWIDTH] IN BLOOD BY AUTOMATED COUNT: 16.7 % (ref 11.5–14.5)
FERRITIN SERPL-MCNC: 383 NG/ML (ref 8–150)
FOLATE SERPL-MCNC: >24 NG/ML
HCT VFR BLD AUTO: 31.3 % (ref 36–46)
HGB BLD-MCNC: 9.4 G/DL (ref 12–16)
HOLD SPECIMEN: NORMAL
HOLD SPECIMEN: NORMAL
IMM GRANULOCYTES # BLD AUTO: 0.01 X10*3/UL (ref 0–0.5)
IMM GRANULOCYTES NFR BLD AUTO: 0.2 % (ref 0–0.9)
IRON SATN MFR SERPL: 31 % (ref 25–45)
IRON SERPL-MCNC: 84 UG/DL (ref 35–150)
LYMPHOCYTES # BLD AUTO: 2.61 X10*3/UL (ref 0.8–3)
LYMPHOCYTES NFR BLD AUTO: 53.2 %
MCH RBC QN AUTO: 20.5 PG (ref 26–34)
MCHC RBC AUTO-ENTMCNC: 30 G/DL (ref 32–36)
MCV RBC AUTO: 68 FL (ref 80–100)
MONOCYTES # BLD AUTO: 0.35 X10*3/UL (ref 0.05–0.8)
MONOCYTES NFR BLD AUTO: 7.1 %
NEUTROPHILS # BLD AUTO: 1.77 X10*3/UL (ref 1.6–5.5)
NEUTROPHILS NFR BLD AUTO: 36.1 %
NRBC BLD-RTO: 0 /100 WBCS (ref 0–0)
PLATELET # BLD AUTO: 258 X10*3/UL (ref 150–450)
RBC # BLD AUTO: 4.58 X10*6/UL (ref 4–5.2)
TIBC SERPL-MCNC: 269 UG/DL (ref 240–445)
UIBC SERPL-MCNC: 185 UG/DL (ref 110–370)
VIT B12 SERPL-MCNC: 546 PG/ML (ref 211–911)
WBC # BLD AUTO: 4.9 X10*3/UL (ref 4.4–11.3)

## 2024-07-18 PROCEDURE — 1159F MED LIST DOCD IN RCRD: CPT | Performed by: PHYSICIAN ASSISTANT

## 2024-07-18 PROCEDURE — 82607 VITAMIN B-12: CPT

## 2024-07-18 PROCEDURE — 3078F DIAST BP <80 MM HG: CPT | Performed by: PHYSICIAN ASSISTANT

## 2024-07-18 PROCEDURE — 99214 OFFICE O/P EST MOD 30 MIN: CPT | Performed by: PHYSICIAN ASSISTANT

## 2024-07-18 PROCEDURE — 85025 COMPLETE CBC W/AUTO DIFF WBC: CPT

## 2024-07-18 PROCEDURE — 1160F RVW MEDS BY RX/DR IN RCRD: CPT | Performed by: PHYSICIAN ASSISTANT

## 2024-07-18 PROCEDURE — 86140 C-REACTIVE PROTEIN: CPT

## 2024-07-18 PROCEDURE — 82746 ASSAY OF FOLIC ACID SERUM: CPT

## 2024-07-18 PROCEDURE — 36415 COLL VENOUS BLD VENIPUNCTURE: CPT

## 2024-07-18 PROCEDURE — 82728 ASSAY OF FERRITIN: CPT

## 2024-07-18 PROCEDURE — 83540 ASSAY OF IRON: CPT

## 2024-07-18 PROCEDURE — 3077F SYST BP >= 140 MM HG: CPT | Performed by: PHYSICIAN ASSISTANT

## 2024-07-18 NOTE — PROGRESS NOTES
Patient ID: Nava Gutierrez is a 83 y.o. female.  Referring Physician: No referring provider defined for this encounter.  Primary Care Provider: Terra Gray MD  Visit Type: Follow Up    Location: [facility] - Main  Diagnosis/Reason: Alpha Thalssemia Minor, Anemia Chronic Disease (CKD, Inflammation)    Interval Hx  7/18/24  Nava Gutierrez is a 83 y.o. female following up today for alpha thalassemia minor, anemia of chronic inflammation    NOTE:  1/15/24 - Patient was originally followed by ANTONINA York and is transferring care to me today. Per ASHLEE Leo's last note, patient's anemia is multifactorial and attributed to alpha thal minor and anemia of chronic inflammation. Patient has been taking folic acid 1 mg PO QD w/o adverse effect or reaction.    Patient denies weight loss, abnormal bruising and bleeding, hematuria, blood in stool, dark/black stools, epistaxis, oral/gingival bleeding, lymphadenopathy, recurrent infections, recurrent fevers, night sweats, early satiety, abdominal pain, bone pain, chest pain, palpitations, SOB, HASTINGS, fatigue, dizziness, lightheadedness, PICA.    Relevant Hx  7/13/23 - Last Visit w/ ANTONINA York  Ms. Gutierrez is a very pleasant 82-year-old  female with a longstanding history of microcytic anemia who has been empirically treated with vitamin B12 and iron supplementation without improvement in her anemia. She has had an extensive workup  for this microcytic anemia, and the conclusion is that she likely has alpha thalassemia minor. Normally Hgb 10s but has decreased to 9.5 - 9.9 this last July 2021.      Today, patient presents for follow-up. Reports feeling well overall. Taking folic acid daily and iron every other day w/o issue. Eating iron rich foods - beans, spinach, kale. No other new issues. To visit family this Aug-Sept in [place].      Denies abnormal weight loss, night sweats, fever. Denies chills, abnormal sob at rest, cp, n/v/d, n/t. No PICA. Denies  any abnormal bleeding or bruising. No recurrent infections or lymphadenopathy. Has chronic joint/body pain - gets injections to lt knee.  No known blood disorders in family. Has had surgery in past w/o issue.    PMHx:  Active Ambulatory Problems     Diagnosis Date Noted   • Age related osteoporosis 01/25/2023   • Allergic rhinitis 01/25/2023   • Anxiety 01/25/2023   • Asthma ([facility]) 01/25/2023   • Asymptomatic hyperuricemia 01/25/2023   • Benign familial tremor 01/25/2023   • Constipation 01/25/2023   • GERD (gastroesophageal reflux disease) 01/25/2023   • Glaucoma 01/25/2023   • Hyperlipemia 01/25/2023   • Hypertension 01/25/2023   • Hyponatremia 01/25/2023   • Microcytic anemia 01/25/2023   • Plantar fasciitis 01/25/2023   • Pre-diabetes 01/25/2023   • Primary osteoarthritis of left knee 01/25/2023   • Seasonal allergies 01/25/2023   • Vitamin D deficiency 01/25/2023   • Post herpetic neuralgia 01/10/2024     Resolved Ambulatory Problems     Diagnosis Date Noted   • No Resolved Ambulatory Problems     Past Medical History:   Diagnosis Date   • Abnormal finding of blood chemistry, unspecified 08/23/2016   • Benign neoplasm of colon, unspecified 03/21/2014   • Dietary counseling and surveillance 06/10/2018   • Encounter for immunization 11/18/2015   • Hypomagnesemia 09/07/2018   • Impacted cerumen, left ear 01/27/2020   • Influenza due to other identified influenza virus with other respiratory manifestations 01/15/2015   • Osteoarthritis of knee, unspecified 08/30/2018   • Other instability, unspecified knee 09/22/2014   • Other long term (current) drug therapy 07/25/2014   • Overweight 06/10/2018   • Pain in left shoulder 04/02/2020   • Pain in unspecified knee 09/22/2014   • Personal history of diseases of the blood and blood-forming organs and certain disorders involving the immune mechanism 03/21/2014   • Personal history of other benign neoplasm 03/21/2014   • Personal history of other diseases of the nervous  system and sense organs 08/29/2021   • Personal history of other diseases of the respiratory system 02/23/2017   • Personal history of other diseases of the respiratory system 02/19/2016   • Personal history of other endocrine, nutritional and metabolic disease 11/15/2016   • Personal history of other endocrine, nutritional and metabolic disease 03/21/2014   • Personal history of other infectious and parasitic diseases 07/07/2014   • Personal history of other specified conditions 02/19/2016   • Personal history of other specified conditions 11/07/2017   • Personal history of other specified conditions 08/22/2016   • Personal history of other specified conditions 03/16/2016   • Personal history of urinary (tract) infections 07/21/2013   • Right upper quadrant pain 11/07/2017   • Unspecified asthma with (acute) exacerbation ([facility]) 11/20/2019   • Unspecified asthma with (acute) exacerbation ([facility]) 11/20/2019     Microcytic anemia, longstanding, related to thalassemia.  Essential tremor  Hypertension.  Diabetes.  Asthma.  Tubular adenoma on colonoscopy in 2008. Repeat 2014 NL  Helicobacter pylori gastritis 5/2014, treated with antibiotics  GERD - on PPI    PSHx:  Past Surgical History:   Procedure Laterality Date   • COLONOSCOPY  03/21/2014    Complete Colonoscopy   • HYSTERECTOMY  12/02/2013    Hysterectomy   • KNEE SURGERY  09/22/2014    Knee Surgery       FHx:  Family History   Problem Relation Name Age of Onset   • Other (Advanced Age) Mother     • Tremor Father     • Tremor Sister     • Tremor Paternal Grandmother         Social Hx:  Nava Gutierrez    reports that she has quit smoking. Her smoking use included cigarettes. She has never used smokeless tobacco.  She  reports no history of alcohol use.  She  reports no history of drug use.  Social History     Socioeconomic History   • Marital status:    • Number of children: 1   Tobacco Use   • Smoking status: Former     Types: Cigarettes   • Smokeless  tobacco: Never   Substance and Sexual Activity   • Alcohol use: Never   • Drug use: Never   • Sexual activity: Not Currently     Birth control/protection: Post-menopausal     Alcohol Use: Denies  Smoking: Former smoker - Quit 1975  Recreational Drug Use: Denies  Special Diets: Regular diet    Medications and allergies reviewed in EMR.    ROS:  Review of Systems - Oncology   10 point review of systems negative except as state in HPI.    Vitals & Statistics:  Objective   BSA: There is no height or weight on file to calculate BSA.  There were no vitals taken for this visit.    Physical Exam:  Physical Exam  Vitals and nursing note reviewed.   Constitutional:       Appearance: Normal appearance. She is normal weight.   HENT:      Head: Normocephalic and atraumatic.      Right Ear: External ear normal.      Left Ear: External ear normal.      Nose: Nose normal.      Mouth/Throat:      Mouth: Mucous membranes are moist.      Pharynx: Oropharynx is clear.   Eyes:      Extraocular Movements: Extraocular movements intact.      Conjunctiva/sclera: Conjunctivae normal.      Pupils: Pupils are equal, round, and reactive to light.   Cardiovascular:      Rate and Rhythm: Normal rate and regular rhythm.      Pulses: Normal pulses.      Heart sounds: Normal heart sounds.   Pulmonary:      Effort: Pulmonary effort is normal.      Breath sounds: Normal breath sounds.   Abdominal:      General: Abdomen is flat. Bowel sounds are normal.      Palpations: Abdomen is soft.      Comments: No masses or organomegaly palpable during exam   Musculoskeletal:         General: Normal range of motion.      Cervical back: Normal range of motion.      Comments: Ambulates w/ cane   Lymphadenopathy:      Comments: No lymphadenopathy palpable   Skin:     General: Skin is warm and dry.      Capillary Refill: Capillary refill takes less than 2 seconds.   Neurological:      Mental Status: She is alert and oriented to person, place, and time. Mental status  "is at baseline.      Comments: Tremor present (chronic condition for patient)   Psychiatric:         Mood and Affect: Mood normal.         Behavior: Behavior normal.         Thought Content: Thought content normal.         Judgment: Judgment normal.         Results:  Lab Results   Component Value Date    WBC 5.5 07/08/2024    NEUTROABS 1.66 11/24/2023    IGABSOL 0.02 11/24/2023    LYMPHSABS 2.52 11/24/2023    MONOSABS 0.52 11/24/2023    EOSABS 0.21 11/24/2023    BASOSABS 0.07 11/24/2023    RBC 4.72 07/08/2024    MCV 68 (L) 07/08/2024    MCHC 30.1 (L) 07/08/2024    HGB 9.6 (L) 07/08/2024    HCT 31.9 (L) 07/08/2024     07/08/2024     Lab Results   Component Value Date    RETICCTPCT 1.1 07/13/2023      Lab Results   Component Value Date    CREATININE 0.88 07/08/2024    BUN 19 07/08/2024    EGFR 66 07/08/2024     (L) 07/08/2024    K 4.5 07/08/2024    CL 98 07/08/2024    CO2 29 07/08/2024      Lab Results   Component Value Date    ALT 10 07/08/2024    AST 17 07/08/2024    ALKPHOS 87 07/08/2024    BILITOT 0.3 07/08/2024      Lab Results   Component Value Date    TSH 3.78 07/08/2024     Lab Results   Component Value Date    TSH 3.78 07/08/2024     Lab Results   Component Value Date    IRON 76 01/15/2024    TIBC 280 01/15/2024    FERRITIN 364 (H) 01/15/2024      Lab Results   Component Value Date    [id number] 473 07/08/2024      Lab Results   Component Value Date    FOLATE >24.0 01/15/2024     Lab Results   Component Value Date    SEDRATE 31 (H) 01/15/2024      Lab Results   Component Value Date    CRP 2.05 (H) 01/15/2024      No results found for: \"ALEXUS\"  Lab Results   Component Value Date     07/13/2023     Lab Results   Component Value Date    HAPTOGLOBIN 145 01/12/2023     Lab Results   Component Value Date    SPEP NORMAL 07/30/2021     No results found for: \"IGG\", \"IGM\", \"IGA\"  No results found for: \"HEPATOT\", \"HEPAIGM\", \"HEPBCIGM\", \"HEPBCAB\", \"HEPBSAG\", \"HEPCAB\"  No results found for: " "\"HIV1X2\"    Assessment:  Nava Gutierrez is a 83 y.o. female following up today for alpha thal minor, anemia of chronic inflammation    I reviewed patient's chart including but not limited to labs, imaging, surgical/procedure notes, pathology, hospital notes, doctor's notes.     7/18/24 results:  WBC count WNL - No Differental  Microcytic, hypochromic anemia w/ Hb at 9.5 - RBC count WNL, Hct low, RDW elevated  Platelets WNL  Remaining results pending at time of writing    Plan:  1) Alpha Thalassemia Minor  2) Anemia of Chronic Inflammation  Labs today: CBC-D, CRP, Iron, B12, Folate - Results pending - Will review and address adverse results as needed  Continue folic acid 1mg PO QD  RTC in 4 months via in-person visit - F/U sooner if needed/urgent  7/18/24 - Discussed with patient the role, purpose and adverse effects of EPO supplementation. Recommended patient to start EPO injections given that patient's hemoglobin has consistently remained < 10 for more than 2 months. Patient declines EPO supplementation at this time and states she would like to revisit the discussion at next FUV    I had an extensive discussion with the patient regarding the diagnosis and discussed the plan of therapy, including general considerations regarding side effects and outcomes. Pt understood and gave appropriate teach back about the plan of care. All questions were answered to the patient's satisfaction. The patient is instructed to contact us at any time if questions or problems arise. Thank you for the opportunity to participate in the care of this very pleasant patient.    Total time = 30 minutes. 50% or more of this time was spent in counseling and/or coordination of care including reviewing medical history/radiology/labs, examining patient, formulating outlined plan with team, and discussing plan with patient/family.      Bran Dia PA-C     "

## 2024-07-19 DIAGNOSIS — K21.9 GASTROESOPHAGEAL REFLUX DISEASE WITHOUT ESOPHAGITIS: ICD-10-CM

## 2024-07-19 DIAGNOSIS — I10 PRIMARY HYPERTENSION: ICD-10-CM

## 2024-07-19 NOTE — PROGRESS NOTES
"Subjective   Patient ID: Nava Gutierrez is a 83 y.o. female who presents for Medicare Annual Wellness Visit Subsequent.      HPI   The patient reports that she is taking nifedipine and valsartan for hypertension, omeprazole for GERD, pravastatin for hyperlipidemia, gabapentin for post hepatic neuralgia pain, Flovent as well as Albuterol as needed for asthma, sodium tablets for hyponatremia and Primidone, Topamax and propanolol for her essential tremors. She is taking these medications as prescribed and denies having any side effects from them.    Review of Systems  Constitutional: No fever or chills, No Night Sweats  Eyes: No Blurry Vision or Eye sight problems  ENT: No Nasal Discharge, Hoarseness, sore throat  Cardiovascular: no chest pain, no palpitations and no syncope.   Respiratory: no cough, no shortness of breath during exertion and no shortness of breath at rest.   Gastrointestinal: no abdominal pain, no nausea and no vomiting.   : No vaginal discharge, burning with urination, no blood in urine or stools  Skin: No Skin rashes or Lesions  Neuro: No Headache, no dizziness or Numbness or tingling  Psych: No Anxiety, depression or sleeping problems  Heme: No Easy bleeding or brusing.     Objective   /68   Pulse 69   Ht 1.486 m (4' 10.5\")   Wt 62.6 kg (138 lb)   SpO2 95%   BMI 28.35 kg/m²     Physical Exam  Constitutional: Alert and in no acute distress. Well developed, well nourished.   Head and Face: Head and face: Normal.    Eyes: Normal external exam. Pupils were equal in size, round, reactive to light (PERRL) with normal accommodation and extraocular movements intact (EOMI).   Ears, Nose, Mouth, and Throat: External inspection of ears and nose: Normal.  Hearing: Normal.  Nasal mucosa, septum, and turbinates: Normal.  Lips, teeth, and gums: Normal.  Oropharynx: Normal.   Neck: No neck mass was observed. Supple. Thyroid not enlarged and there were no palpable thyroid nodules.   Cardiovascular: " Heart rate and rhythm were normal, normal S1 and S2. Pedal pulses: Normal. No peripheral edema.   Pulmonary: No respiratory distress. Clear bilateral breath sounds.   Abdomen: Soft nontender; no abdominal mass palpated. Normal bowel sounds. No organomegaly.   Musculoskeletal: No joint swelling seen, normal movements of all extremities. Range of motion: Normal.  Muscle strength/tone: Normal.    Skin: Normal skin color and pigmentation, normal skin turgor, and no rash.   Neurologic: Deep tendon reflexes were 2+ and symmetric.   Psychiatric: Judgment and insight: Intact. Mood and affect: Normal.  Lymphatic: No cervical lymphadenopathy. Palpation of lymph nodes in axillae: Normal.  Palpation of lymph nodes in groin: Normal.    Lab Results   Component Value Date    WBC 4.9 07/18/2024    HGB 9.4 (L) 07/18/2024    HCT 31.3 (L) 07/18/2024     07/18/2024    CHOL 199 07/08/2024    TRIG 308 (H) 07/08/2024    HDL 42.7 07/08/2024    ALT 10 07/08/2024    AST 17 07/08/2024     (L) 07/08/2024    K 4.5 07/08/2024    CL 98 07/08/2024    CREATININE 0.88 07/08/2024    BUN 19 07/08/2024    CO2 29 07/08/2024    TSH 3.78 07/08/2024    INR 1.1 01/31/2018    HGBA1C 5.9 (H) 07/08/2024       BI mammo bilateral screening tomosynthesis  Narrative: Interpreted By:  Pearl Cohn,   STUDY:  BI MAMMO BILATERAL SCREENING TOMOSYNTHESIS;  2/27/2024 10:10 am      ACCESSION NUMBER(S):  [id number]      ORDERING CLINICIAN:  SELF REFERRAL      INDICATION:  Screening.  History of benign left breast biopsy.      COMPARISON:  12/05/2022 12/03/2021      FINDINGS:  2D and tomosynthesis images were reviewed at 1 mm slice thickness.      Density:  There are areas of scattered fibroglandular tissue.      No suspicious masses or calcifications are identified.      Impression: No mammographic evidence of malignancy.          BI-RADS CATEGORY:  BI-RADS Category:  1 Negative.  Recommendation:  Routine Screening Mammogram in 1 Year.  Recommended Date:   1 Year.  Laterality:  Bilateral.      For any future breast imaging appointments, please call [phone]  ([phone]).          MACRO:  None      Signed by: Pearl Cohn 2/27/2024 4:02 PM  Dictation workstation:   [id number]      Assessment/Plan   Diagnoses and all orders for this visit:  Medicare annual wellness visit, subsequent  HTN (hypertension), benign  -     Follow Up In [facility] - PCP - Medicare Annual  -     NIFEdipine ER (NIFEdipine XL) 90 mg 24 hr tablet; Take 1 tablet (90 mg) by mouth once daily. Do not crush, chew, or split.  -     Follow Up In [facility] - PCP - Established; Future  Asymptomatic menopausal state  -     XR DEXA bone density; Future  Encounter for screening mammogram for breast cancer  -     BI mammo bilateral screening tomosynthesis; Future  Gastroesophageal reflux disease without esophagitis  -     omeprazole (PriLOSEC) 40 mg DR capsule; TAKE 1 CAPSULE BY MOUTH DAILY IN THE MORNING BEFORE A MEAL  Pure hypercholesterolemia  -     pravastatin (Pravachol) 20 mg tablet; Take 1 tablet (20 mg) by mouth once daily.  Benign familial tremor  -     propranolol LA (Inderal LA) 60 mg 24 hr capsule; Take 1 capsule (60 mg) by mouth once daily.  Hyponatremia  -     sodium chloride 1,000 mg tablet; Take 1 tablet (1 g) by mouth once daily.  Mild intermittent asthma, unspecified whether complicated ([facility])  -     beclomethasone dipropionate (Qvar RediHaler) 80 mcg/actuation inhaler; Inhale 1 Inhalation 2 times a day. Rinse mouth with water after use to reduce aftertaste and incidence of candidiasis. Do not swallow.  Post herpetic neuralgia        Dear Nava Gutierrez     It was my pleasure to take care of you today in the office. Below are the things we discussed today:    1. 1. Immunizations: Yearly Flu shot is recommended.          a: COVID: Please get booster from the pharmacy in fall         b: Tetanus: Please get from the pharmacy          c: Shingrix: Please get from the  pharmacy         d: Pneumovax: Up-to-date         e: Prevnar: Up-to-date         F. RSV: Please get from the pharmacy in fall    2. Blood Work: Reviewed  3. Seen your dentist twice a year  4. Yearly Eye exam is recommended    5. BMI: Overweight   6: Diet recommendations:   Eat Clean, Try to have as many home cooked meals as possible  Avoid processed foods which contain excess calories, sugar, and sodium.    7. Exercise recommendations:   150 minutes a week to maintain your weight     If you have to loose weight, you need a better diet and exercise plan.     8. Supplements recommended:  a - Calcium 600 mg up to twice a day to get a total of 1200 mg. Each 8 oz of milk or yogurt or 1 oz of cheese, 1 Banana, 1 serving of green Leafy vegetable has about 300 mg of Calcium, so you may subtract that amount. Calcium citrate is the only acceptable supplement to take if you take an acid suppressing medication like Prilosec; otherwise Calcium carbonate is acceptable too (It can cause Constipation).   b - Vitamin D - 2000 IU daily     9. Please get your Living will / Advance directive completed if you do not have one already. Please make sure our office has a copy of the latest one.     10. Colonoscopy: Uptodate. Last done in July 2019. No repeats recommended   11. Mammogram: Uptodate. Ordered for Feb 2025   12. PAP: Not indicated   13. DEXA: Ordered   14: Skin Check: Please see Dermatology once a year for a Skin Check.     15. Hypertension: Continue nifedipine and valsartan.     16. GERD: Continue omeprazole.      17. Hyperlipidemia: Continue pravastatin.     18. Post hepatic neuralgia pain: The patient will taper off gabapentin.     19. Hyponatremia: Continue sodium tablets.     20. Asthma: Stop Flovent due to insurance coverage issues. Start Qvar inhaler. Continue Albuterol as needed.     21. Essential tremors: Continue Primidone, Topamax and Propanolol.    Follow up in 4 months.    Follow up in one year for a Physical.  Please call the office before your Physical to see if you need blood work completed prior to your physical.     Please call me if any questions arise from now until your next visit. I will call you after I am done seeing patients. A Doctor is always available by phone when the office is closed. Please feel free to call for help with any problem that you feel shouldn't wait until the office re-opens.     Scribe Attestation  By signing my name below, IJaye Scribe   attest that this documentation has been prepared under the direction and in the presence of Terra Gray MD.

## 2024-07-21 RX ORDER — OMEPRAZOLE 40 MG/1
CAPSULE, DELAYED RELEASE ORAL
Qty: 100 CAPSULE | Refills: 0 | OUTPATIENT
Start: 2024-07-21

## 2024-07-21 RX ORDER — VALSARTAN 320 MG/1
320 TABLET ORAL DAILY
Qty: 100 TABLET | Refills: 0 | OUTPATIENT
Start: 2024-07-21

## 2024-07-23 ENCOUNTER — APPOINTMENT (OUTPATIENT)
Dept: PRIMARY CARE | Facility: CLINIC | Age: 84
End: 2024-07-23
Payer: MEDICARE

## 2024-07-23 VITALS
SYSTOLIC BLOOD PRESSURE: 142 MMHG | OXYGEN SATURATION: 95 % | BODY MASS INDEX: 27.82 KG/M2 | DIASTOLIC BLOOD PRESSURE: 68 MMHG | HEART RATE: 69 BPM | WEIGHT: 138 LBS | HEIGHT: 59 IN

## 2024-07-23 DIAGNOSIS — I10 HTN (HYPERTENSION), BENIGN: ICD-10-CM

## 2024-07-23 DIAGNOSIS — Z12.31 ENCOUNTER FOR SCREENING MAMMOGRAM FOR BREAST CANCER: ICD-10-CM

## 2024-07-23 DIAGNOSIS — G25.0 BENIGN FAMILIAL TREMOR: ICD-10-CM

## 2024-07-23 DIAGNOSIS — E87.1 HYPONATREMIA: ICD-10-CM

## 2024-07-23 DIAGNOSIS — K21.9 GASTROESOPHAGEAL REFLUX DISEASE WITHOUT ESOPHAGITIS: ICD-10-CM

## 2024-07-23 DIAGNOSIS — B02.29 POST HERPETIC NEURALGIA: ICD-10-CM

## 2024-07-23 DIAGNOSIS — E78.00 PURE HYPERCHOLESTEROLEMIA: ICD-10-CM

## 2024-07-23 DIAGNOSIS — Z00.00 MEDICARE ANNUAL WELLNESS VISIT, SUBSEQUENT: Primary | ICD-10-CM

## 2024-07-23 DIAGNOSIS — Z78.0 ASYMPTOMATIC MENOPAUSAL STATE: ICD-10-CM

## 2024-07-23 DIAGNOSIS — J45.20 MILD INTERMITTENT ASTHMA, UNSPECIFIED WHETHER COMPLICATED (HHS-HCC): ICD-10-CM

## 2024-07-23 PROCEDURE — 1170F FXNL STATUS ASSESSED: CPT | Performed by: FAMILY MEDICINE

## 2024-07-23 PROCEDURE — G0439 PPPS, SUBSEQ VISIT: HCPCS | Performed by: FAMILY MEDICINE

## 2024-07-23 PROCEDURE — 1159F MED LIST DOCD IN RCRD: CPT | Performed by: FAMILY MEDICINE

## 2024-07-23 PROCEDURE — 99397 PER PM REEVAL EST PAT 65+ YR: CPT | Performed by: FAMILY MEDICINE

## 2024-07-23 PROCEDURE — 3077F SYST BP >= 140 MM HG: CPT | Performed by: FAMILY MEDICINE

## 2024-07-23 PROCEDURE — 1160F RVW MEDS BY RX/DR IN RCRD: CPT | Performed by: FAMILY MEDICINE

## 2024-07-23 PROCEDURE — 1036F TOBACCO NON-USER: CPT | Performed by: FAMILY MEDICINE

## 2024-07-23 PROCEDURE — 3078F DIAST BP <80 MM HG: CPT | Performed by: FAMILY MEDICINE

## 2024-07-23 PROCEDURE — 99214 OFFICE O/P EST MOD 30 MIN: CPT | Performed by: FAMILY MEDICINE

## 2024-07-23 PROCEDURE — 1123F ACP DISCUSS/DSCN MKR DOCD: CPT | Performed by: FAMILY MEDICINE

## 2024-07-23 RX ORDER — PRAVASTATIN SODIUM 20 MG/1
20 TABLET ORAL DAILY
Qty: 90 TABLET | Refills: 0 | Status: SHIPPED | OUTPATIENT
Start: 2024-07-23

## 2024-07-23 RX ORDER — OMEPRAZOLE 40 MG/1
CAPSULE, DELAYED RELEASE ORAL
Qty: 100 CAPSULE | Refills: 0 | Status: SHIPPED | OUTPATIENT
Start: 2024-07-23

## 2024-07-23 RX ORDER — SODIUM CHLORIDE 1000 MG
1 TABLET, SOLUBLE MISCELLANEOUS DAILY
Qty: 90 TABLET | Refills: 1 | Status: SHIPPED | OUTPATIENT
Start: 2024-07-23

## 2024-07-23 RX ORDER — BECLOMETHASONE DIPROPIONATE HFA 80 UG/1
80 AEROSOL, METERED RESPIRATORY (INHALATION)
Qty: 10.6 G | Refills: 2 | Status: SHIPPED | OUTPATIENT
Start: 2024-07-23

## 2024-07-23 RX ORDER — NIFEDIPINE 90 MG/1
90 TABLET, EXTENDED RELEASE ORAL DAILY
Qty: 90 TABLET | Refills: 0 | Status: SHIPPED | OUTPATIENT
Start: 2024-07-23

## 2024-07-23 RX ORDER — PROPRANOLOL HYDROCHLORIDE 60 MG/1
60 CAPSULE, EXTENDED RELEASE ORAL DAILY
Qty: 90 CAPSULE | Refills: 0 | Status: SHIPPED | OUTPATIENT
Start: 2024-07-23

## 2024-07-23 ASSESSMENT — PATIENT HEALTH QUESTIONNAIRE - PHQ9
2. FEELING DOWN, DEPRESSED OR HOPELESS: NOT AT ALL
SUM OF ALL RESPONSES TO PHQ9 QUESTIONS 1 AND 2: 0
1. LITTLE INTEREST OR PLEASURE IN DOING THINGS: NOT AT ALL

## 2024-07-23 ASSESSMENT — COLUMBIA-SUICIDE SEVERITY RATING SCALE - C-SSRS
1. IN THE PAST MONTH, HAVE YOU WISHED YOU WERE DEAD OR WISHED YOU COULD GO TO SLEEP AND NOT WAKE UP?: NO
2. HAVE YOU ACTUALLY HAD ANY THOUGHTS OF KILLING YOURSELF?: NO

## 2024-07-23 ASSESSMENT — ACTIVITIES OF DAILY LIVING (ADL)
DOING_HOUSEWORK: INDEPENDENT
TAKING_MEDICATION: INDEPENDENT
BATHING: INDEPENDENT
DRESSING: INDEPENDENT
GROCERY_SHOPPING: INDEPENDENT
MANAGING_FINANCES: INDEPENDENT

## 2024-07-23 ASSESSMENT — ENCOUNTER SYMPTOMS
OCCASIONAL FEELINGS OF UNSTEADINESS: 1
LOSS OF SENSATION IN FEET: 0
DEPRESSION: 0

## 2024-07-23 NOTE — PATIENT INSTRUCTIONS
Immunizations you need to get from the pharmacy: RSV, COVID booster, Shingrix, Tdap    Scheduling Radiology tests: [phone]    If you need labs done, please go to any  lab, no paperwork needed. If a Lipid panel is ordered, you will need to fast overnight, other blood work does not require fasting.   Lab in this building is in [address] (M-F 7:30-5:00pm and Sat 8-12pm)    Please call me if any questions arise from now until your next visit. I will call you after I am done seeing patients. A Doctor is always available by phone when the office is closed. Please feel free to call for help with any problem that you feel shouldn't wait until the office re-opens.

## 2024-08-06 ENCOUNTER — TELEPHONE (OUTPATIENT)
Dept: PRIMARY CARE | Facility: CLINIC | Age: 84
End: 2024-08-06
Payer: MEDICARE

## 2024-08-06 DIAGNOSIS — J45.20 MILD INTERMITTENT ASTHMA, UNSPECIFIED WHETHER COMPLICATED (HHS-HCC): Primary | ICD-10-CM

## 2024-08-06 RX ORDER — FLUTICASONE PROPIONATE 110 UG/1
1 AEROSOL, METERED RESPIRATORY (INHALATION)
Qty: 12 G | Refills: 5 | Status: SHIPPED | OUTPATIENT
Start: 2024-08-06 | End: 2024-08-08 | Stop reason: SDUPTHER

## 2024-08-08 DIAGNOSIS — J45.20 MILD INTERMITTENT ASTHMA, UNSPECIFIED WHETHER COMPLICATED (HHS-HCC): ICD-10-CM

## 2024-08-08 RX ORDER — FLUTICASONE PROPIONATE 110 UG/1
1 AEROSOL, METERED RESPIRATORY (INHALATION)
Qty: 12 G | Refills: 5 | Status: SHIPPED | OUTPATIENT
Start: 2024-08-08 | End: 2025-08-08

## 2024-08-10 DIAGNOSIS — B02.29 POST HERPETIC NEURALGIA: ICD-10-CM

## 2024-08-12 RX ORDER — GABAPENTIN 300 MG/1
300 CAPSULE ORAL NIGHTLY
Qty: 100 CAPSULE | Refills: 1 | Status: SHIPPED | OUTPATIENT
Start: 2024-08-12

## 2024-08-16 DIAGNOSIS — J45.20 MILD INTERMITTENT ASTHMA, UNSPECIFIED WHETHER COMPLICATED (HHS-HCC): Primary | ICD-10-CM

## 2024-08-16 NOTE — PROGRESS NOTES
Clinical Pharmacy Appointment    Patient ID: Nava Gutierrez is a 83 y.o. female who presents for Asthma.    Pt is here for First appointment.     Referring Provider: Terra Gray MD  PCP: Terra Gray MD   Last visit with PCP: 7/23/24   Next visit with PCP: 12/2/24      Subjective   HPI  PULMONARY ASSESSMENT  Patient has been diagnosed with: Asthma  does not see a pulmonologist    Current Regimen:  Flovent 110 mcg/act 1 puff twice daily  Albuterol 2.5 mg/3 mL nebulizer solution 3 mL every 4-6 hours as needed    Appropriate Inhaler Technique? yes  How often do you use your rescue inhaler? Patient is using her nebulizer twice daily since she has been without her maintenance inhaler    Symptom Assessment:  Current symptoms: dyspnea  Symptoms are worsened   Triggers: exertion  Alleviating factors: albuterol nebulizer    Exacerbation Hx:  When was your last hospitalization for an exacerbation? January 2018  When was the last time you were treated with antibiotics and/or steroids? Does not recall     Immunization History:  Influenza: 10/18/23  PCV13: 6/17/17  PPSV23: 7/25/14   PCV20: N/A  COVID: 9/30/22  RSV: N/A    Smoking history:  She quit smoking approximately  30  years ago.     Drug Interactions  No relevant drug interactions were noted.    Medication System Management  Patient's preferred pharmacy: [facility]  Adherence/Organization: No issues  Affordability/Accessibility: Inhaler cost     Patient Assistance Screening (VAF)    Patient verbally reports monthly or yearly income which is less than 400% federal poverty level    Application for program has been submitted for the following medications: Qvar    Patient has been informed that program team will be reaching out to them to discuss necessary documentation, instructed to answer phone/return voicemail.     Patient aware this process may take up to 2 weeks.     If approved medication must [facility] and may be  picked up or mailed to patient.      Objective   No Known Allergies  Social History     Social History Narrative    Not on file      Medication Review  Current Outpatient Medications   Medication Instructions    albuterol 2.5 mg /3 mL (0.083 %) nebulizer solution USE 1 UNIT DOSE EVERY 4-6 HOURS AS NEEDED FOR WHEEZING .    beclomethasone dipropionate (Qvar) 80 mcg/actuation inhaler 1 Inhalation, inhalation, 2 times daily RT, Rinse mouth with water after use to reduce aftertaste and incidence of candidiasis. Do not swallow.    CALCIUM ORAL Calcium TABS    cholecalciferol (Vitamin D-3) 50 mcg (2,000 unit) capsule take 1 capsule by mouth once daily    fluticasone (Flovent) 110 mcg/actuation inhaler 1 puff, inhalation, 2 times daily RT, Rinse mouth with water after use to reduce aftertaste and incidence of candidiasis. Do not swallow.    folic acid (FOLVITE) 1 mg, oral, Daily    gabapentin (NEURONTIN) 300 mg, oral, Nightly    latanoprost (Xalatan) 0.005 % ophthalmic solution No dose, route, or frequency recorded.    magnesium oxide 400 mg magnesium capsule TAKE 1 CAPSULE Daily    NIFEdipine ER (ADALAT CC) 90 mg, oral, Daily, Do not crush, chew, or split.    omeprazole (PriLOSEC) 40 mg DR capsule TAKE 1 CAPSULE BY MOUTH DAILY IN THE MORNING BEFORE A MEAL    pravastatin (PRAVACHOL) 20 mg, oral, Daily    primidone (MYSOLINE) 250 mg, oral, 2 times daily    propranolol LA (INDERAL LA) 60 mg, oral, Daily    sodium chloride 1 g, oral, Daily    topiramate (TOPAMAX) 50 mg, oral, 2 times daily    valsartan (DIOVAN) 320 mg, oral, Daily      Vitals  BP Readings from Last 2 Encounters:   07/23/24 142/68   07/18/24 156/62     BMI Readings from Last 1 Encounters:   07/23/24 28.35 kg/m²      Labs  A1C  Lab Results   Component Value Date    HGBA1C 5.9 (H) 07/08/2024    HGBA1C 5.9 (A) 06/09/2023    HGBA1C 6.0 (A) 06/17/2022     BMP  Lab Results   Component Value Date    CALCIUM 9.1 07/08/2024     (L) 07/08/2024    K 4.5 07/08/2024     CO2 29 07/08/2024    CL 98 07/08/2024    BUN 19 07/08/2024    CREATININE 0.88 07/08/2024    EGFR 66 07/08/2024     LFTs  Lab Results   Component Value Date    ALT 10 07/08/2024    AST 17 07/08/2024    ALKPHOS 87 07/08/2024    BILITOT 0.3 07/08/2024     FLP  Lab Results   Component Value Date    TRIG 308 (H) 07/08/2024    CHOL 199 07/08/2024    LDLF 96 06/15/2023    LDLCALC 95 07/08/2024    HDL 42.7 07/08/2024     Urine Microalbumin  Lab Results   Component Value Date    MICROALBCREA 38.7 (H) 07/08/2024     Weight Management  Wt Readings from Last 3 Encounters:   07/23/24 62.6 kg (138 lb)   07/18/24 63.1 kg (139 lb 3.2 oz)   06/10/24 62.1 kg (137 lb)      There is no height or weight on file to calculate BMI.     Assessment/Plan   Problem List Items Addressed This Visit       Asthma ([facility]) - Primary     Patient has not used maintenance inhaler in a few weeks due to running out, change in maintenance ICS coverage, and cost. Breathing has not been controlled with frequent nighttime awakenings and daily albuterol use.  PAP application will be submitted for Qvar.         Relevant Medications    beclomethasone dipropionate (Qvar) 80 mcg/actuation inhaler     Clinical Pharmacist follow-up: 1 month    Continue all meds under the continuation of care with the referring provider and clinical pharmacy team.    Thank you,  Soledad Rowan, PharmD  Clinical Pharmacist  [phone]    Verbal consent to manage patient's drug therapy was obtained from the patient. They were informed they may decline to participate or withdraw from participation in pharmacy services at any time.

## 2024-08-19 ENCOUNTER — TELEMEDICINE (OUTPATIENT)
Dept: PHARMACY | Facility: HOSPITAL | Age: 84
End: 2024-08-19
Payer: MEDICARE

## 2024-08-19 DIAGNOSIS — J45.20 MILD INTERMITTENT ASTHMA, UNSPECIFIED WHETHER COMPLICATED (HHS-HCC): Primary | ICD-10-CM

## 2024-08-19 NOTE — ASSESSMENT & PLAN NOTE
Patient has not used maintenance inhaler in a few weeks due to running out, change in maintenance ICS coverage, and cost. Breathing has not been controlled with frequent nighttime awakenings and daily albuterol use.  PAP application will be submitted for Qvar.

## 2024-08-20 PROCEDURE — RXMED WILLOW AMBULATORY MEDICATION CHARGE

## 2024-08-21 DIAGNOSIS — I10 PRIMARY HYPERTENSION: ICD-10-CM

## 2024-08-21 RX ORDER — VALSARTAN 320 MG/1
320 TABLET ORAL DAILY
Qty: 100 TABLET | Refills: 1 | Status: SHIPPED | OUTPATIENT
Start: 2024-08-21

## 2024-08-22 ENCOUNTER — PHARMACY VISIT (OUTPATIENT)
Dept: PHARMACY | Facility: CLINIC | Age: 84
End: 2024-08-22
Payer: COMMERCIAL

## 2024-09-09 ENCOUNTER — HOSPITAL ENCOUNTER (OUTPATIENT)
Dept: RADIOLOGY | Facility: EXTERNAL LOCATION | Age: 84
Discharge: HOME | End: 2024-09-09

## 2024-09-09 ENCOUNTER — HOSPITAL ENCOUNTER (OUTPATIENT)
Dept: RADIOLOGY | Facility: HOSPITAL | Age: 84
Discharge: HOME | End: 2024-09-09
Payer: MEDICARE

## 2024-09-09 ENCOUNTER — OFFICE VISIT (OUTPATIENT)
Dept: ORTHOPEDIC SURGERY | Facility: HOSPITAL | Age: 84
End: 2024-09-09
Payer: MEDICARE

## 2024-09-09 DIAGNOSIS — M17.12 PRIMARY OSTEOARTHRITIS OF LEFT KNEE: ICD-10-CM

## 2024-09-09 DIAGNOSIS — M17.12 PRIMARY OSTEOARTHRITIS OF LEFT KNEE: Primary | ICD-10-CM

## 2024-09-09 PROCEDURE — 1123F ACP DISCUSS/DSCN MKR DOCD: CPT | Performed by: EMERGENCY MEDICINE

## 2024-09-09 PROCEDURE — 99214 OFFICE O/P EST MOD 30 MIN: CPT | Mod: 25 | Performed by: EMERGENCY MEDICINE

## 2024-09-09 PROCEDURE — 1159F MED LIST DOCD IN RCRD: CPT | Performed by: EMERGENCY MEDICINE

## 2024-09-09 PROCEDURE — 2500000005 HC RX 250 GENERAL PHARMACY W/O HCPCS: Performed by: EMERGENCY MEDICINE

## 2024-09-09 PROCEDURE — 73564 X-RAY EXAM KNEE 4 OR MORE: CPT | Mod: LT

## 2024-09-09 PROCEDURE — 2500000004 HC RX 250 GENERAL PHARMACY W/ HCPCS (ALT 636 FOR OP/ED): Performed by: EMERGENCY MEDICINE

## 2024-09-09 PROCEDURE — 20611 DRAIN/INJ JOINT/BURSA W/US: CPT | Mod: LT | Performed by: EMERGENCY MEDICINE

## 2024-09-09 PROCEDURE — 99214 OFFICE O/P EST MOD 30 MIN: CPT | Performed by: EMERGENCY MEDICINE

## 2024-09-09 PROCEDURE — 73564 X-RAY EXAM KNEE 4 OR MORE: CPT | Mod: LEFT SIDE | Performed by: RADIOLOGY

## 2024-09-09 RX ORDER — TRIAMCINOLONE ACETONIDE 40 MG/ML
80 INJECTION, SUSPENSION INTRA-ARTICULAR; INTRAMUSCULAR
Status: COMPLETED | OUTPATIENT
Start: 2024-09-09 | End: 2024-09-09

## 2024-09-09 RX ORDER — LIDOCAINE HYDROCHLORIDE 10 MG/ML
4 INJECTION INFILTRATION; PERINEURAL
Status: COMPLETED | OUTPATIENT
Start: 2024-09-09 | End: 2024-09-09

## 2024-09-09 ASSESSMENT — ENCOUNTER SYMPTOMS: KNEE DEFORMITY: 1

## 2024-09-09 NOTE — PROGRESS NOTES
Subjective   Nava Gutierrez is a 83 y.o. female who presents for Follow-up of the Left Knee (DOLI: 2/29/24/)    Left Knee       9/9/24: Patient returns today for left knee pain.  He did perform a left knee corticosteroid injection for her on 2/29/2024.  She felt that this worked quite well up until recently.  Her pain is since returned to become progressively worse.  Consider this, she would like to repeat injection form today.  No additional complaints.    2/29/24: Patient returns today for left knee pain.  We did perform a left knee corticosteroid injection for her on 12/7/2023.  She felt that it worked quite well and would like to have a repeat injection today.  No additional complaints.    12/7/23: Patient turns today for left knee pain.  She has known left knee DJD and we did perform a left knee aspiration and injection on 9/14/2023.  She felt this worked quite well for her and would like to have a repeat injection performed today.  No additional complaints this time.    9/14/23: Patient returns today for left knee pain. We did perform a left knee aspiration and injection on 6/15/2023. She felt this worked quite well for her up until recently. She like to have a repeat injection performed today. She denies any further injuries. She has no additional complaints at this time.    6/15/23: Patient turns today for left knee pain. We performed a left knee aspiration and injection on 3/16/2023 that worked quite well for her. She presents today for repeat injection. She denies any additional injuries. She has no additional complaints today.    3/16/23: Patient returns today for left knee pain. She states previous injection she received on 12/15/2022 worked quite well until recently. She presents today requesting a repeat injection. She denies any further injuries. She has no additional complaints at this time.    12/15/22: Patient returns today for left knee pain. She states the preinjection she received on 9/22/2022  worked quite well up until recently. She presents today requesting repeat injection. She denies any further injuries. She has no additional complaints at this time.    9/22/22: 81-year-old female present known to me is presenting with complaint of chronic left knee pain. She has known moderate to severe left knee arthritis. She did have a knee aspiration and injection by Dr. Joseph on 5/18/2022 that worked quite well for her up until recently. She presents today requesting a repeat injection. She denies any interval injury. She describes the pain as deep and throbbing. She has no additional complaints today.     ROS: All pertinent positive symptoms are included in the history of present illness.    All other systems have been reviewed and are negative and noncontributory to this patient's current ailments.    Objective     There were no vitals filed for this visit.    Physical Exam  General/Constitutional: No apparent distress. Well-nourished and well developed.  Head: Normocephalic, Atraumatic.   Eyes: EOMI.  Vascular: No edema, swelling or tenderness, except as noted in detailed exam.  Respiratory: Non-labored breathing.  Integumentary: No impressive skin lesions present, except as noted in detailed exam.  Neurological: Oriented to person, place, and time.  Psychological: Normal mood and affect.  Musculoskeletal: Normal, except as noted in detailed exam.  Left knee  Flexion 120. Extension 5. Varus deformity present. Crepitus present with knee flexion/extension. Positive joint hypertrophy. Grade 2 effusion. No ecchymosis. Muscle strength 5 out of 5 with flexion and extension. Lachman negative. Anterior drawer negative. Posterior drawer negative. Valgus stress negative. Varus stress negative. Kamla positive.     Patient ID: Nava Gutierrez is a 83 y.o. female.    L Inj/Asp: L knee on 9/9/2024 11:27 AM  Indications: pain and joint swelling  Details: 18 G needle, ultrasound-guided superolateral  approach  Medications: 80 mg triamcinolone acetonide 40 mg/mL; 4 mL lidocaine 10 mg/mL (1 %)  Aspirate: 25 mL clear and yellow  Outcome: tolerated well, no immediate complications  Procedure, treatment alternatives, risks and benefits explained, specific risks discussed. Consent was given by the patient. Immediately prior to procedure a time out was called to verify the correct patient, procedure, equipment, support staff and site/side marked as required. Patient was prepped and draped in the usual sterile fashion.             Assessment/Plan   Problem List Items Addressed This Visit       Primary osteoarthritis of left knee    Relevant Orders    XR knee left 4+ views    Point of Care Ultrasound (Completed)     Considering that she is done well with previous corticosteroid injections, I feel that a repeat injection is warranted. Discussed risks versus benefits of having injection performed. Patient has elected to proceed with procedure. Please refer to procedure note above. Discussed with patient to limit weightbearing activities over the next 24-48 hours, they can then progress to full activities as tolerated. Discussed with patient to avoid water submersion over the next 2 days. Discussed with patient to call me immediately if they develop worsening pain, rash, erythema, or fevers. Patient should follow-up as needed if pain persists or worsens.    Boom Ponce, DO  Sports Medicine  [facility]     ** Please excuse any errors in grammar or translation related to this dictation. Voice recognition software was utilized to prepare this document. **

## 2024-09-23 DIAGNOSIS — G25.0 BENIGN FAMILIAL TREMOR: ICD-10-CM

## 2024-09-23 DIAGNOSIS — I10 HTN (HYPERTENSION), BENIGN: ICD-10-CM

## 2024-09-23 DIAGNOSIS — E78.00 PURE HYPERCHOLESTEROLEMIA: ICD-10-CM

## 2024-09-25 RX ORDER — NIFEDIPINE 90 MG/1
90 TABLET, EXTENDED RELEASE ORAL DAILY
Qty: 100 TABLET | Refills: 0 | Status: SHIPPED | OUTPATIENT
Start: 2024-09-25

## 2024-09-25 RX ORDER — PRAVASTATIN SODIUM 20 MG/1
20 TABLET ORAL DAILY
Qty: 100 TABLET | Refills: 0 | Status: SHIPPED | OUTPATIENT
Start: 2024-09-25

## 2024-09-25 RX ORDER — PROPRANOLOL HYDROCHLORIDE 60 MG/1
60 CAPSULE, EXTENDED RELEASE ORAL DAILY
Qty: 100 CAPSULE | Refills: 0 | Status: SHIPPED | OUTPATIENT
Start: 2024-09-25

## 2024-09-30 ENCOUNTER — HOSPITAL ENCOUNTER (OUTPATIENT)
Dept: RADIOLOGY | Facility: CLINIC | Age: 84
Discharge: HOME | End: 2024-09-30
Payer: MEDICARE

## 2024-09-30 DIAGNOSIS — Z78.0 ASYMPTOMATIC MENOPAUSAL STATE: ICD-10-CM

## 2024-09-30 PROCEDURE — 77080 DXA BONE DENSITY AXIAL: CPT | Performed by: RADIOLOGY

## 2024-09-30 PROCEDURE — 77080 DXA BONE DENSITY AXIAL: CPT

## 2024-10-10 ENCOUNTER — TELEPHONE (OUTPATIENT)
Dept: PHARMACY | Facility: HOSPITAL | Age: 84
End: 2024-10-10
Payer: MEDICARE

## 2024-10-10 DIAGNOSIS — J45.20 MILD INTERMITTENT ASTHMA, UNSPECIFIED WHETHER COMPLICATED (HHS-HCC): Primary | ICD-10-CM

## 2024-10-10 PROCEDURE — RXMED WILLOW AMBULATORY MEDICATION CHARGE

## 2024-10-10 RX ORDER — ALBUTEROL SULFATE 0.83 MG/ML
SOLUTION RESPIRATORY (INHALATION)
Qty: 75 ML | Refills: 1 | Status: SHIPPED | OUTPATIENT
Start: 2024-10-10

## 2024-10-10 NOTE — TELEPHONE ENCOUNTER
Called patient to see how Qvar is working. Patient denies shortness of breath and wheezing but has been coughing recently. Humidifier has helped. She has not used albuterol lately, but the vials are out of date and she requests a new prescription. Sent to [facility] per request.

## 2024-10-14 ENCOUNTER — PHARMACY VISIT (OUTPATIENT)
Dept: PHARMACY | Facility: CLINIC | Age: 84
End: 2024-10-14
Payer: COMMERCIAL

## 2024-10-19 DIAGNOSIS — K21.9 GASTROESOPHAGEAL REFLUX DISEASE WITHOUT ESOPHAGITIS: ICD-10-CM

## 2024-10-21 RX ORDER — OMEPRAZOLE 40 MG/1
CAPSULE, DELAYED RELEASE ORAL
Qty: 100 CAPSULE | Refills: 0 | Status: SHIPPED | OUTPATIENT
Start: 2024-10-21

## 2024-11-14 ENCOUNTER — APPOINTMENT (OUTPATIENT)
Dept: HEMATOLOGY/ONCOLOGY | Facility: HOSPITAL | Age: 84
End: 2024-11-14
Payer: MEDICARE

## 2024-11-20 DIAGNOSIS — E78.00 PURE HYPERCHOLESTEROLEMIA: ICD-10-CM

## 2024-11-20 DIAGNOSIS — I10 HTN (HYPERTENSION), BENIGN: ICD-10-CM

## 2024-11-20 DIAGNOSIS — G25.0 BENIGN FAMILIAL TREMOR: ICD-10-CM

## 2024-11-21 ENCOUNTER — OFFICE VISIT (OUTPATIENT)
Dept: HEMATOLOGY/ONCOLOGY | Facility: HOSPITAL | Age: 84
End: 2024-11-21
Payer: MEDICARE

## 2024-11-21 ENCOUNTER — LAB (OUTPATIENT)
Dept: LAB | Facility: HOSPITAL | Age: 84
End: 2024-11-21
Payer: MEDICARE

## 2024-11-21 VITALS
DIASTOLIC BLOOD PRESSURE: 67 MMHG | TEMPERATURE: 99.5 F | HEART RATE: 79 BPM | OXYGEN SATURATION: 94 % | BODY MASS INDEX: 29.17 KG/M2 | WEIGHT: 141.98 LBS | SYSTOLIC BLOOD PRESSURE: 154 MMHG

## 2024-11-21 DIAGNOSIS — N18.9 ANEMIA OF CHRONIC RENAL FAILURE, UNSPECIFIED CKD STAGE: ICD-10-CM

## 2024-11-21 DIAGNOSIS — D63.8 ANEMIA OF CHRONIC DISEASE: Chronic | ICD-10-CM

## 2024-11-21 DIAGNOSIS — D63.1 ANEMIA OF CHRONIC RENAL FAILURE, UNSPECIFIED CKD STAGE: ICD-10-CM

## 2024-11-21 DIAGNOSIS — D63.8 ANEMIA OF CHRONIC DISEASE: Primary | Chronic | ICD-10-CM

## 2024-11-21 DIAGNOSIS — D56.3 ALPHA THALASSEMIA TRAIT: Chronic | ICD-10-CM

## 2024-11-21 DIAGNOSIS — D50.9 MICROCYTIC ANEMIA: ICD-10-CM

## 2024-11-21 LAB
ALBUMIN SERPL BCP-MCNC: 4.4 G/DL (ref 3.4–5)
ALP SERPL-CCNC: 94 U/L (ref 33–136)
ALT SERPL W P-5'-P-CCNC: 9 U/L (ref 7–45)
ANION GAP SERPL CALC-SCNC: 14 MMOL/L (ref 10–20)
AST SERPL W P-5'-P-CCNC: 15 U/L (ref 9–39)
BASOPHILS # BLD AUTO: 0.05 X10*3/UL (ref 0–0.1)
BASOPHILS NFR BLD AUTO: 0.8 %
BILIRUB SERPL-MCNC: 0.2 MG/DL (ref 0–1.2)
BUN SERPL-MCNC: 25 MG/DL (ref 6–23)
CALCIUM SERPL-MCNC: 9.9 MG/DL (ref 8.6–10.3)
CHLORIDE SERPL-SCNC: 105 MMOL/L (ref 98–107)
CO2 SERPL-SCNC: 27 MMOL/L (ref 21–32)
CREAT SERPL-MCNC: 0.93 MG/DL (ref 0.5–1.05)
EGFRCR SERPLBLD CKD-EPI 2021: 62 ML/MIN/1.73M*2
EOSINOPHIL # BLD AUTO: 0.11 X10*3/UL (ref 0–0.4)
EOSINOPHIL NFR BLD AUTO: 1.9 %
ERYTHROCYTE [DISTWIDTH] IN BLOOD BY AUTOMATED COUNT: 16.3 % (ref 11.5–14.5)
FERRITIN SERPL-MCNC: 326 NG/ML (ref 8–150)
FOLATE SERPL-MCNC: >24 NG/ML
GLUCOSE SERPL-MCNC: 115 MG/DL (ref 74–99)
HCT VFR BLD AUTO: 31.8 % (ref 36–46)
HGB BLD-MCNC: 9.7 G/DL (ref 12–16)
IMM GRANULOCYTES # BLD AUTO: 0.01 X10*3/UL (ref 0–0.5)
IMM GRANULOCYTES NFR BLD AUTO: 0.2 % (ref 0–0.9)
IRON SATN MFR SERPL: 24 % (ref 25–45)
IRON SERPL-MCNC: 61 UG/DL (ref 35–150)
LYMPHOCYTES # BLD AUTO: 3.2 X10*3/UL (ref 0.8–3)
LYMPHOCYTES NFR BLD AUTO: 54.1 %
MCH RBC QN AUTO: 20.9 PG (ref 26–34)
MCHC RBC AUTO-ENTMCNC: 30.5 G/DL (ref 32–36)
MCV RBC AUTO: 69 FL (ref 80–100)
MONOCYTES # BLD AUTO: 0.44 X10*3/UL (ref 0.05–0.8)
MONOCYTES NFR BLD AUTO: 7.4 %
NEUTROPHILS # BLD AUTO: 2.11 X10*3/UL (ref 1.6–5.5)
NEUTROPHILS NFR BLD AUTO: 35.6 %
NRBC BLD-RTO: 0 /100 WBCS (ref 0–0)
PLATELET # BLD AUTO: 230 X10*3/UL (ref 150–450)
POTASSIUM SERPL-SCNC: 4.7 MMOL/L (ref 3.5–5.3)
PROT SERPL-MCNC: 8 G/DL (ref 6.4–8.2)
RBC # BLD AUTO: 4.64 X10*6/UL (ref 4–5.2)
SODIUM SERPL-SCNC: 141 MMOL/L (ref 136–145)
TIBC SERPL-MCNC: 259 UG/DL (ref 240–445)
UIBC SERPL-MCNC: 198 UG/DL (ref 110–370)
VIT B12 SERPL-MCNC: 521 PG/ML (ref 211–911)
WBC # BLD AUTO: 5.9 X10*3/UL (ref 4.4–11.3)

## 2024-11-21 PROCEDURE — 82668 ASSAY OF ERYTHROPOIETIN: CPT

## 2024-11-21 PROCEDURE — 99214 OFFICE O/P EST MOD 30 MIN: CPT | Performed by: PHYSICIAN ASSISTANT

## 2024-11-21 PROCEDURE — 1159F MED LIST DOCD IN RCRD: CPT | Performed by: PHYSICIAN ASSISTANT

## 2024-11-21 PROCEDURE — 82607 VITAMIN B-12: CPT

## 2024-11-21 PROCEDURE — 82728 ASSAY OF FERRITIN: CPT

## 2024-11-21 PROCEDURE — 1123F ACP DISCUSS/DSCN MKR DOCD: CPT | Performed by: PHYSICIAN ASSISTANT

## 2024-11-21 PROCEDURE — 3078F DIAST BP <80 MM HG: CPT | Performed by: PHYSICIAN ASSISTANT

## 2024-11-21 PROCEDURE — 36415 COLL VENOUS BLD VENIPUNCTURE: CPT

## 2024-11-21 PROCEDURE — 1160F RVW MEDS BY RX/DR IN RCRD: CPT | Performed by: PHYSICIAN ASSISTANT

## 2024-11-21 PROCEDURE — 83540 ASSAY OF IRON: CPT

## 2024-11-21 PROCEDURE — 80053 COMPREHEN METABOLIC PANEL: CPT

## 2024-11-21 PROCEDURE — 85025 COMPLETE CBC W/AUTO DIFF WBC: CPT

## 2024-11-21 PROCEDURE — 1125F AMNT PAIN NOTED PAIN PRSNT: CPT | Performed by: PHYSICIAN ASSISTANT

## 2024-11-21 PROCEDURE — 3077F SYST BP >= 140 MM HG: CPT | Performed by: PHYSICIAN ASSISTANT

## 2024-11-21 PROCEDURE — 82746 ASSAY OF FOLIC ACID SERUM: CPT

## 2024-11-21 RX ORDER — PROPRANOLOL HYDROCHLORIDE 60 MG/1
60 CAPSULE, EXTENDED RELEASE ORAL DAILY
Qty: 100 CAPSULE | Refills: 0 | OUTPATIENT
Start: 2024-11-21

## 2024-11-21 RX ORDER — NIFEDIPINE 90 MG/1
90 TABLET, EXTENDED RELEASE ORAL DAILY
Qty: 100 TABLET | Refills: 0 | OUTPATIENT
Start: 2024-11-21

## 2024-11-21 RX ORDER — FOLIC ACID 1 MG/1
1 TABLET ORAL DAILY
Qty: 90 TABLET | Refills: 3 | Status: SHIPPED | OUTPATIENT
Start: 2024-11-21

## 2024-11-21 RX ORDER — PRAVASTATIN SODIUM 20 MG/1
20 TABLET ORAL DAILY
Qty: 100 TABLET | Refills: 0 | OUTPATIENT
Start: 2024-11-21

## 2024-11-21 ASSESSMENT — PAIN SCALES - GENERAL: PAINLEVEL_OUTOF10: 8

## 2024-11-21 NOTE — PROGRESS NOTES
Patient ID: Nava Gutierrez is a 83 y.o. female.  Referring Physician: Bran Dia PA-C  [id number] [place] [address]  Primary Care Provider: Terra Gray MD  Visit Type: Follow Up    [facility]: [facility]  Diagnosis/Reason: Alpha Thalssemia Minor, Anemia Chronic Disease (CKD, Inflammation)    Interval Hx  11/21/24  Nava Gutierrez is a 83 y.o. female following up today for alpha thalassemia minor, anemia of chronic inflammation    NOTE:  1/15/24 - Patient was originally followed by ANTONINA York and is transferring care to me today. Per ASHLEE Leo's last note, patient's anemia is multifactorial and attributed to alpha thal minor and anemia of chronic inflammation. Patient has been taking folic acid 1 mg PO QD w/o adverse effect or reaction.    Patient denies weight loss, abnormal bruising and bleeding, hematuria, blood in stool, dark/black stools, epistaxis, oral/gingival bleeding, lymphadenopathy, recurrent infections, recurrent fevers, night sweats, early satiety, abdominal pain, bone pain, chest pain, palpitations, SOB, HASTINGS, fatigue, dizziness, lightheadedness, PICA.    Relevant Hx  7/13/23 - Last Visit w/ ANTONINA York  Ms. Gutierrez is a very pleasant 82-year-old  female with a longstanding history of microcytic anemia who has been empirically treated with vitamin B12 and iron supplementation without improvement in her anemia. She has had an extensive workup  for this microcytic anemia, and the conclusion is that she likely has alpha thalassemia minor. Normally Hgb 10s but has decreased to 9.5 - 9.9 this last July 2021.      Today, patient presents for follow-up. Reports feeling well overall. Taking folic acid daily and iron every other day w/o issue. Eating iron rich foods - beans, spinach, kale. No other new issues. To visit family this Aug-Sept in [place].      Denies abnormal weight loss, night sweats, fever. Denies chills, abnormal sob at rest, cp, n/v/d, n/t. No  PICA. Denies any abnormal bleeding or bruising. No recurrent infections or lymphadenopathy. Has chronic joint/body pain - gets injections to lt knee.  No known blood disorders in family. Has had surgery in past w/o issue.    PMHx:  Active Ambulatory Problems     Diagnosis Date Noted    Age related osteoporosis 01/25/2023    Allergic rhinitis 01/25/2023    Anxiety 01/25/2023    Asthma 01/25/2023    Asymptomatic hyperuricemia 01/25/2023    Benign familial tremor 01/25/2023    Constipation 01/25/2023    GERD (gastroesophageal reflux disease) 01/25/2023    Glaucoma 01/25/2023    Hyperlipemia 01/25/2023    Hyponatremia 01/25/2023    Microcytic anemia 01/25/2023    Plantar fasciitis 01/25/2023    Pre-diabetes 01/25/2023    Primary osteoarthritis of left knee 01/25/2023    Seasonal allergies 01/25/2023    Vitamin D deficiency 01/25/2023    Post herpetic neuralgia 01/10/2024    Alpha thalassemia trait 07/18/2024    Anemia of chronic disease 07/18/2024    HTN (hypertension), benign 07/23/2024     Resolved Ambulatory Problems     Diagnosis Date Noted    Hypertension 01/25/2023     Past Medical History:   Diagnosis Date    Abnormal finding of blood chemistry, unspecified 08/23/2016    Benign neoplasm of colon, unspecified 03/21/2014    Dietary counseling and surveillance 06/10/2018    Encounter for immunization 11/18/2015    Hypomagnesemia 09/07/2018    Impacted cerumen, left ear 01/27/2020    Influenza due to other identified influenza virus with other respiratory manifestations 01/15/2015    Osteoarthritis of knee, unspecified 08/30/2018    Other instability, unspecified knee 09/22/2014    Other long term (current) drug therapy 07/25/2014    Overweight 06/10/2018    Pain in left shoulder 04/02/2020    Pain in unspecified knee 09/22/2014    Personal history of diseases of the blood and blood-forming organs and certain disorders involving the immune mechanism 03/21/2014    Personal history of other benign neoplasm 03/21/2014     Personal history of other diseases of the nervous system and sense organs 08/29/2021    Personal history of other diseases of the respiratory system 02/23/2017    Personal history of other diseases of the respiratory system 02/19/2016    Personal history of other endocrine, nutritional and metabolic disease 11/15/2016    Personal history of other endocrine, nutritional and metabolic disease 03/21/2014    Personal history of other infectious and parasitic diseases 07/07/2014    Personal history of other specified conditions 02/19/2016    Personal history of other specified conditions 11/07/2017    Personal history of other specified conditions 08/22/2016    Personal history of other specified conditions 03/16/2016    Personal history of urinary (tract) infections 07/21/2013    Right upper quadrant pain 11/07/2017    Unspecified asthma with (acute) exacerbation (HHS-HCC) 11/20/2019    Unspecified asthma with (acute) exacerbation (HHS-HCC) 11/20/2019     Microcytic anemia, longstanding, related to thalassemia.  Essential tremor  Hypertension.  Diabetes.  Asthma.  Tubular adenoma on colonoscopy in 2008. Repeat 2014 NL  Helicobacter pylori gastritis 5/2014, treated with antibiotics  GERD - on PPI    PSHx:  Past Surgical History:   Procedure Laterality Date    COLONOSCOPY  03/21/2014    Complete Colonoscopy    HYSTERECTOMY  12/02/2013    Hysterectomy    KNEE SURGERY  09/22/2014    Knee Surgery       FHx:  Family History   Problem Relation Name Age of Onset    Other (Advanced Age) Mother      Tremor Father      Tremor Sister      Tremor Paternal Grandmother         Social Hx:  Nava Gutierrez    reports that she quit smoking about 30 years ago. Her smoking use included cigarettes. She has never used smokeless tobacco.  She  reports no history of alcohol use.  She  reports no history of drug use.  Social History     Socioeconomic History    Marital status:     Number of children: 1   Tobacco Use    Smoking status:  Former     Current packs/day: 0.00     Types: Cigarettes     Quit date:      Years since quittin.9    Smokeless tobacco: Never   Substance and Sexual Activity    Alcohol use: Never    Drug use: Never    Sexual activity: Not Currently     Birth control/protection: Post-menopausal     Alcohol Use: Denies  Smoking: Former smoker - Quit   Recreational Drug Use: Denies  Special Diets: Regular diet    Medications and allergies reviewed in EMR.    ROS:  Review of Systems - Oncology   10 point review of systems negative except as state in HPI.    Vitals & Statistics:  Objective   BSA: 1.63 meters squared  /67 (BP Location: Left arm, Patient Position: Sitting)   Pulse 79   Temp 37.5 °C (99.5 °F) (Temporal)   Wt 64.4 kg (141 lb 15.6 oz)   SpO2 94%   BMI 29.17 kg/m²     Physical Exam:  Physical Exam  Vitals and nursing note reviewed.   Constitutional:       Appearance: Normal appearance. She is normal weight.   HENT:      Head: Normocephalic and atraumatic.      Right Ear: External ear normal.      Left Ear: External ear normal.      Nose: Nose normal.      Mouth/Throat:      Mouth: Mucous membranes are moist.      Pharynx: Oropharynx is clear.   Eyes:      Extraocular Movements: Extraocular movements intact.      Conjunctiva/sclera: Conjunctivae normal.      Pupils: Pupils are equal, round, and reactive to light.   Cardiovascular:      Rate and Rhythm: Normal rate and regular rhythm.      Pulses: Normal pulses.      Heart sounds: Normal heart sounds.   Pulmonary:      Effort: Pulmonary effort is normal.      Breath sounds: Normal breath sounds.   Abdominal:      General: Abdomen is flat. Bowel sounds are normal.      Palpations: Abdomen is soft.      Comments: No masses or organomegaly palpable during exam   Musculoskeletal:         General: Normal range of motion.      Cervical back: Normal range of motion.      Comments: Ambulates w/ cane   Lymphadenopathy:      Comments: No lymphadenopathy palpable  "  Skin:     General: Skin is warm and dry.      Capillary Refill: Capillary refill takes less than 2 seconds.   Neurological:      Mental Status: She is alert and oriented to person, place, and time. Mental status is at baseline.      Comments: Tremor present (chronic condition for patient)   Psychiatric:         Mood and Affect: Mood normal.         Behavior: Behavior normal.         Thought Content: Thought content normal.         Judgment: Judgment normal.       Results:  Lab Results   Component Value Date    WBC 5.9 11/21/2024    NEUTROABS 2.11 11/21/2024    IGABSOL 0.01 11/21/2024    LYMPHSABS 3.20 (H) 11/21/2024    MONOSABS 0.44 11/21/2024    EOSABS 0.11 11/21/2024    BASOSABS 0.05 11/21/2024    RBC 4.64 11/21/2024    MCV 69 (L) 11/21/2024    MCHC 30.5 (L) 11/21/2024    HGB 9.7 (L) 11/21/2024    HCT 31.8 (L) 11/21/2024     11/21/2024     Lab Results   Component Value Date    RETICCTPCT 1.1 07/13/2023      Lab Results   Component Value Date    CREATININE 0.88 07/08/2024    BUN 19 07/08/2024    EGFR 66 07/08/2024     (L) 07/08/2024    K 4.5 07/08/2024    CL 98 07/08/2024    CO2 29 07/08/2024      Lab Results   Component Value Date    ALT 10 07/08/2024    AST 17 07/08/2024    ALKPHOS 87 07/08/2024    BILITOT 0.3 07/08/2024      Lab Results   Component Value Date    TSH 3.78 07/08/2024     Lab Results   Component Value Date    TSH 3.78 07/08/2024     Lab Results   Component Value Date    IRON 84 07/18/2024    TIBC 269 07/18/2024    FERRITIN 383 (H) 07/18/2024      Lab Results   Component Value Date    DFOAWUAP10 546 07/18/2024      Lab Results   Component Value Date    FOLATE >24.0 07/18/2024     Lab Results   Component Value Date    SEDRATE 31 (H) 01/15/2024      Lab Results   Component Value Date    CRP 2.26 (H) 07/18/2024      No results found for: \"ALEXUS\"  Lab Results   Component Value Date     07/13/2023     Lab Results   Component Value Date    HAPTOGLOBIN 145 01/12/2023     Lab Results " "  Component Value Date    SPEP NORMAL 07/30/2021     No results found for: \"IGG\", \"IGM\", \"IGA\"  No results found for: \"HEPATOT\", \"HEPAIGM\", \"HEPBCIGM\", \"HEPBCAB\", \"HEPBSAG\", \"HEPCAB\"  No results found for: \"HIV1X2\"    Assessment:  Nava Gutierrez is a 83 y.o. female following up today for alpha thal minor, anemia of chronic inflammation    I reviewed patient's chart including but not limited to labs, imaging, surgical/procedure notes, pathology, hospital notes, doctor's notes.    11/21/24 results:  WBC count WNL - Isolated lymphocytosis at 3.20  Microcytic, hypochromic anemia w/ Hb stable at 9.7 - RBC count WNL, Hct low, RDW elevated  Platelets WNL  Remaining results pending    Plan:  1) Alpha Thalassemia Minor  2) Anemia of Chronic Inflammation  Labs today: CBC-D, CRP, Iron, B12, Folate - Results pending - Will review and address adverse results as needed  Continue folic acid 1mg PO QD - Refills submitted  RTC in 4 months via in-person visit - F/U sooner if needed/urgent  CBC-D, CMP, Iron, B12, Folate, EPO,   Will consider flow cytometry if lymphocytes elevated at next visit  Patient will be out of town from 12/2024 until 2/2025 and requests to have FUV planned for March, 2025 7/18/24 - Discussed with patient the role, purpose and adverse effects of EPO supplementation. Recommended patient to start EPO injections given that patient's hemoglobin has consistently remained < 10 for more than 2 months. Patient declines EPO supplementation at this time and states she would like to revisit the discussion at next FUV  11/21/24 - Patient requests additional time to consider EPO supplementation and declines starting EPO therapy at this time    I had an extensive discussion with the patient regarding the diagnosis and discussed the plan of therapy, including general considerations regarding side effects and outcomes. Pt understood and gave appropriate teach back about the plan of care. All questions were answered to the " patient's satisfaction. The patient is instructed to contact us at any time if questions or problems arise. Thank you for the opportunity to participate in the care of this very pleasant patient.    Total time = 30 minutes. 50% or more of this time was spent in counseling and/or coordination of care including reviewing medical history/radiology/labs, examining patient, formulating outlined plan with team, and discussing plan with patient/family.      Bran Dia PA-C

## 2024-11-22 LAB — EPO SERPL-ACNC: 17 MU/ML (ref 4–27)

## 2024-11-22 NOTE — PROGRESS NOTES
"Subjective   Patient ID: Nava Gutierrez is a 83 y.o. female who presents for Follow-up (4 Month).    HPI patient presents today for a 4-month follow-up.  She has been doing well and denies having any side effects.    Review of Systems  Constitutional: No fever or chills  Cardiovascular: no chest pain, no palpitations and no syncope.   Respiratory: no cough, no shortness of breath during exertion and no shortness of breath at rest.   Gastrointestinal: no abdominal pain, no nausea and no vomiting.  Neuro: No Headache, no dizziness    Objective   /60   Pulse 82   Ht 1.486 m (4' 10.5\")   Wt 64.9 kg (143 lb)   SpO2 97%   BMI 29.38 kg/m²     Physical Exam  Constitutional: Alert and in no acute distress. Well developed, well nourished  Head and Face: Head and face: Normal.    Cardiovascular: Heart rate and rhythm were normal, normal S1 and S2. No peripheral edema.   Pulmonary: No respiratory distress. Clear bilateral breath sounds.  Musculoskeletal: Gait and station: Normal. Muscle strength/tone: Normal.   Skin: Normal skin color and pigmentation, normal skin turgor, and no rash.    Psychiatric: Judgment and insight: Intact. Mood and affect: Normal.    Lab Results   Component Value Date    WBC 5.9 11/21/2024    HGB 9.7 (L) 11/21/2024    HCT 31.8 (L) 11/21/2024     11/21/2024    CHOL 199 07/08/2024    TRIG 308 (H) 07/08/2024    HDL 42.7 07/08/2024    ALT 9 11/21/2024    AST 15 11/21/2024     11/21/2024    K 4.7 11/21/2024     11/21/2024    CREATININE 0.93 11/21/2024    BUN 25 (H) 11/21/2024    CO2 27 11/21/2024    TSH 3.78 07/08/2024    INR 1.1 01/31/2018    HGBA1C 5.9 (H) 07/08/2024       XR DEXA bone density  Narrative: Interpreted By:  Francisco Olson,   STUDY:  DEXA BONE DENSITY9/30/2024 10:07 am      INDICATION:  Signs/Symptoms:See Associated Diagnosis. The patient is a 84 y/o  year old F. ,Z78.0 Asymptomatic menopausal state      COMPARISON:  Most recent prior is from 09/07/2021..    "   ACCESSION NUMBER(S):  [id number]      ORDERING CLINICIAN:  ANTIONE WALTER      TECHNIQUE:  DEXA BONE DENSITY      FINDINGS:  SPINE L1-L4  Bone Mineral Density: 1.027  T-Score -0.2  Z-Score 1.9  Bone Mineral Density change vs baseline:  7.7  Bone Mineral Density change vs previous: -0.5      LEFT FEMUR -TOTAL  Bone Mineral Density: 0.9  T-Score -0.3   Z-Score  0.8  Bone Mineral Density change vs baseline: 6.5  Bone Mineral Density change vs previous: 3.4      LEFT FEMUR -NECK  Bone Mineral Density: 0.723  T-Score -1.1  Z-Score 0.2  Bone Mineral Density change vs baseline: 12.3  Bone Mineral Density change vs previous: 10.2          World Health Organization (WHO) criteria for post-menopausal,   Women:  Normal:         T-score at or above -1 SD  Osteopenia:   T-score between -1 and -2.5 SD  Osteoporosis: T-score at or below -2.5 SD          10-year Fracture Risk:  Major Osteoporotic Fracture  Not reported  Hip Fracture                        Not reported      Note:  If no FRAX score is reported, it is because:  Some T-score for Spine Total or Hip Total or Femoral Neck at or below  -2.5      This exam was performed at [facility] on a [facility] Dexa Unit.      Impression: DEXA:  According to World Health Organization criteria,  classification is low bone mass (osteopenia)      Followup recommended in two years or sooner as clinically warranted.      All images and detailed analysis are available on the  Radiology  PACS.      MACRO:  None      Signed by: Francisco Olson 9/30/2024 11:14 AM  Dictation workstation:   [id number]      Assessment/Plan   Assessment & Plan  HTN (hypertension), benign  Continue nifedipine and valsartan  Orders:    Follow Up In [facility] - PCP - Established    valsartan (Diovan) 320 mg tablet; Take 1 tablet (320 mg) by mouth once daily.    NIFEdipine ER (NIFEdipine XL) 90 mg 24 hr tablet; Take 1 tablet (90 mg) by mouth once daily. DO NOT CRUSH CHEW  OR SPLIT    Follow Up In [facility] - PCP - Established; Future    Gastroesophageal reflux disease without esophagitis  Stable on omeprazole  Orders:    omeprazole (PriLOSEC) 40 mg DR capsule; TAKE 1 CAPSULE BY MOUTH DAILY IN THE MORNING BEFORE A MEAL    Pure hypercholesterolemia  Continue pravastatin  Orders:    pravastatin (Pravachol) 20 mg tablet; Take 1 tablet (20 mg) by mouth once daily.    Benign familial tremor  Continue to see neurology and patient is on primidone and propranolol  Orders:    primidone (Mysoline) 250 mg tablet; Take 1 tablet (250 mg) by mouth 2 times a day.    propranolol LA (Inderal LA) 60 mg 24 hr capsule; Take 1 capsule (60 mg) by mouth once daily.    Post herpetic neuralgia  Symptoms are stable on gabapentin  Orders:    gabapentin (Neurontin) 300 mg capsule; Take 1 capsule (300 mg) by mouth once daily at bedtime.    Hyponatremia    Orders:    sodium chloride 1,000 mg tablet; Take 1 tablet (1 g) by mouth once daily.    Mild intermittent asthma, unspecified whether complicated ([facility])    Orders:    beclomethasone dipropionate (Qvar) 80 mcg/actuation inhaler; Inhale 1 Inhalation 2 times a day. Rinse mouth with water after use to reduce aftertaste and incidence of candidiasis. Do not swallow.    Chronic obstructive pulmonary disease, unspecified COPD type (Multi)  Patient is using Qvar which is being refilled by  pharmacy       Visit for screening mammogram    Orders:    BI mammo bilateral screening tomosynthesis; Future              Follow up in 4 months    Your yearly Physical is due in: Jul 2025  When you call the office for your yearly Physical, please ask them to inform me to order your blood work, so that you can get the fasting blood work before your appointment and we can discuss the results at your physical.      Please call me if any questions arise from now until your next visit. I will call you after I am done seeing patients. A Doctor is always available by phone  when the office is closed. Please feel free to call for help with any problem that you feel shouldn't wait until the office re-opens.     Terra Gray MD

## 2024-11-25 PROCEDURE — RXMED WILLOW AMBULATORY MEDICATION CHARGE

## 2024-11-27 ENCOUNTER — PHARMACY VISIT (OUTPATIENT)
Dept: PHARMACY | Facility: CLINIC | Age: 84
End: 2024-11-27
Payer: COMMERCIAL

## 2024-12-02 ENCOUNTER — APPOINTMENT (OUTPATIENT)
Dept: PRIMARY CARE | Facility: CLINIC | Age: 84
End: 2024-12-02
Payer: MEDICARE

## 2024-12-02 ENCOUNTER — APPOINTMENT (OUTPATIENT)
Dept: ORTHOPEDIC SURGERY | Facility: HOSPITAL | Age: 84
End: 2024-12-02
Payer: MEDICARE

## 2024-12-02 VITALS
WEIGHT: 143 LBS | BODY MASS INDEX: 28.83 KG/M2 | HEIGHT: 59 IN | SYSTOLIC BLOOD PRESSURE: 110 MMHG | HEART RATE: 82 BPM | OXYGEN SATURATION: 97 % | DIASTOLIC BLOOD PRESSURE: 60 MMHG

## 2024-12-02 DIAGNOSIS — I10 HTN (HYPERTENSION), BENIGN: Primary | ICD-10-CM

## 2024-12-02 DIAGNOSIS — Z12.31 VISIT FOR SCREENING MAMMOGRAM: ICD-10-CM

## 2024-12-02 DIAGNOSIS — J45.20 MILD INTERMITTENT ASTHMA, UNSPECIFIED WHETHER COMPLICATED (HHS-HCC): ICD-10-CM

## 2024-12-02 DIAGNOSIS — B02.29 POST HERPETIC NEURALGIA: ICD-10-CM

## 2024-12-02 DIAGNOSIS — G25.0 BENIGN FAMILIAL TREMOR: ICD-10-CM

## 2024-12-02 DIAGNOSIS — K21.9 GASTROESOPHAGEAL REFLUX DISEASE WITHOUT ESOPHAGITIS: ICD-10-CM

## 2024-12-02 DIAGNOSIS — E87.1 HYPONATREMIA: ICD-10-CM

## 2024-12-02 DIAGNOSIS — J44.9 CHRONIC OBSTRUCTIVE PULMONARY DISEASE, UNSPECIFIED COPD TYPE (MULTI): ICD-10-CM

## 2024-12-02 DIAGNOSIS — E78.00 PURE HYPERCHOLESTEROLEMIA: ICD-10-CM

## 2024-12-02 PROCEDURE — 3078F DIAST BP <80 MM HG: CPT | Performed by: FAMILY MEDICINE

## 2024-12-02 PROCEDURE — 1123F ACP DISCUSS/DSCN MKR DOCD: CPT | Performed by: FAMILY MEDICINE

## 2024-12-02 PROCEDURE — G2211 COMPLEX E/M VISIT ADD ON: HCPCS | Performed by: FAMILY MEDICINE

## 2024-12-02 PROCEDURE — 1160F RVW MEDS BY RX/DR IN RCRD: CPT | Performed by: FAMILY MEDICINE

## 2024-12-02 PROCEDURE — 1036F TOBACCO NON-USER: CPT | Performed by: FAMILY MEDICINE

## 2024-12-02 PROCEDURE — 3074F SYST BP LT 130 MM HG: CPT | Performed by: FAMILY MEDICINE

## 2024-12-02 PROCEDURE — 1159F MED LIST DOCD IN RCRD: CPT | Performed by: FAMILY MEDICINE

## 2024-12-02 PROCEDURE — 99214 OFFICE O/P EST MOD 30 MIN: CPT | Performed by: FAMILY MEDICINE

## 2024-12-02 RX ORDER — OMEPRAZOLE 40 MG/1
CAPSULE, DELAYED RELEASE ORAL
Qty: 90 CAPSULE | Refills: 0 | Status: SHIPPED | OUTPATIENT
Start: 2024-12-02

## 2024-12-02 RX ORDER — NIFEDIPINE 90 MG/1
90 TABLET, EXTENDED RELEASE ORAL DAILY
Qty: 90 TABLET | Refills: 0 | Status: SHIPPED | OUTPATIENT
Start: 2024-12-02

## 2024-12-02 RX ORDER — GABAPENTIN 300 MG/1
300 CAPSULE ORAL NIGHTLY
Qty: 90 CAPSULE | Refills: 0 | Status: SHIPPED | OUTPATIENT
Start: 2024-12-02

## 2024-12-02 RX ORDER — VALSARTAN 320 MG/1
320 TABLET ORAL DAILY
Qty: 90 TABLET | Refills: 0 | Status: SHIPPED | OUTPATIENT
Start: 2024-12-02

## 2024-12-02 RX ORDER — PRIMIDONE 250 MG/1
250 TABLET ORAL 2 TIMES DAILY
Qty: 180 TABLET | Refills: 0 | Status: SHIPPED | OUTPATIENT
Start: 2024-12-02

## 2024-12-02 RX ORDER — PROPRANOLOL HYDROCHLORIDE 60 MG/1
60 CAPSULE, EXTENDED RELEASE ORAL DAILY
Qty: 90 CAPSULE | Refills: 0 | Status: SHIPPED | OUTPATIENT
Start: 2024-12-02

## 2024-12-02 RX ORDER — PRAVASTATIN SODIUM 20 MG/1
20 TABLET ORAL DAILY
Qty: 90 TABLET | Refills: 0 | Status: SHIPPED | OUTPATIENT
Start: 2024-12-02

## 2024-12-02 RX ORDER — SODIUM CHLORIDE 1000 MG
1 TABLET, SOLUBLE MISCELLANEOUS DAILY
Qty: 90 TABLET | Refills: 1 | Status: SHIPPED | OUTPATIENT
Start: 2024-12-02

## 2024-12-02 NOTE — ASSESSMENT & PLAN NOTE
Symptoms are stable on gabapentin  Orders:    gabapentin (Neurontin) 300 mg capsule; Take 1 capsule (300 mg) by mouth once daily at bedtime.

## 2024-12-02 NOTE — ASSESSMENT & PLAN NOTE
Continue pravastatin  Orders:    pravastatin (Pravachol) 20 mg tablet; Take 1 tablet (20 mg) by mouth once daily.

## 2024-12-02 NOTE — ASSESSMENT & PLAN NOTE
Continue nifedipine and valsartan  Orders:    Follow Up In [facility] - PCP - Established    valsartan (Diovan) 320 mg tablet; Take 1 tablet (320 mg) by mouth once daily.    NIFEdipine ER (NIFEdipine XL) 90 mg 24 hr tablet; Take 1 tablet (90 mg) by mouth once daily. DO NOT CRUSH CHEW OR SPLIT    Follow Up In [facility] - PCP - Established; Future

## 2024-12-02 NOTE — ASSESSMENT & PLAN NOTE
Stable on omeprazole  Orders:    omeprazole (PriLOSEC) 40 mg DR capsule; TAKE 1 CAPSULE BY MOUTH DAILY IN THE MORNING BEFORE A MEAL

## 2024-12-02 NOTE — ASSESSMENT & PLAN NOTE
Orders:    beclomethasone dipropionate (Qvar) 80 mcg/actuation inhaler; Inhale 1 Inhalation 2 times a day. Rinse mouth with water after use to reduce aftertaste and incidence of candidiasis. Do not swallow.

## 2024-12-02 NOTE — ASSESSMENT & PLAN NOTE
Continue to see neurology and patient is on primidone and propranolol  Orders:    primidone (Mysoline) 250 mg tablet; Take 1 tablet (250 mg) by mouth 2 times a day.    propranolol LA (Inderal LA) 60 mg 24 hr capsule; Take 1 capsule (60 mg) by mouth once daily.

## 2024-12-09 ENCOUNTER — OFFICE VISIT (OUTPATIENT)
Dept: ORTHOPEDIC SURGERY | Facility: HOSPITAL | Age: 84
End: 2024-12-09
Payer: MEDICARE

## 2024-12-09 ENCOUNTER — HOSPITAL ENCOUNTER (OUTPATIENT)
Dept: RADIOLOGY | Facility: HOSPITAL | Age: 84
Discharge: HOME | End: 2024-12-09
Payer: MEDICARE

## 2024-12-09 ENCOUNTER — HOSPITAL ENCOUNTER (OUTPATIENT)
Dept: RADIOLOGY | Facility: EXTERNAL LOCATION | Age: 84
Discharge: HOME | End: 2024-12-09

## 2024-12-09 DIAGNOSIS — M17.12 ARTHRITIS OF LEFT KNEE: Primary | ICD-10-CM

## 2024-12-09 DIAGNOSIS — M25.561 RIGHT KNEE PAIN, UNSPECIFIED CHRONICITY: ICD-10-CM

## 2024-12-09 PROCEDURE — 2500000004 HC RX 250 GENERAL PHARMACY W/ HCPCS (ALT 636 FOR OP/ED): Performed by: EMERGENCY MEDICINE

## 2024-12-09 PROCEDURE — 1123F ACP DISCUSS/DSCN MKR DOCD: CPT | Performed by: EMERGENCY MEDICINE

## 2024-12-09 PROCEDURE — 20611 DRAIN/INJ JOINT/BURSA W/US: CPT | Mod: LT | Performed by: EMERGENCY MEDICINE

## 2024-12-09 PROCEDURE — 99214 OFFICE O/P EST MOD 30 MIN: CPT | Performed by: EMERGENCY MEDICINE

## 2024-12-09 RX ORDER — TRIAMCINOLONE ACETONIDE 40 MG/ML
80 INJECTION, SUSPENSION INTRA-ARTICULAR; INTRAMUSCULAR
Status: COMPLETED | OUTPATIENT
Start: 2024-12-09 | End: 2024-12-09

## 2024-12-09 RX ORDER — LIDOCAINE HYDROCHLORIDE 10 MG/ML
4 INJECTION, SOLUTION INFILTRATION; PERINEURAL
Status: COMPLETED | OUTPATIENT
Start: 2024-12-09 | End: 2024-12-09

## 2024-12-09 ASSESSMENT — ENCOUNTER SYMPTOMS: KNEE DEFORMITY: 1

## 2024-12-09 NOTE — PROGRESS NOTES
Subjective   Nava Gutierrez is a 83 y.o. female who presents for Follow-up of the Left Knee (DOLI: 9/9/24/)    Left Knee       12/9/24: Patient returns today for left knee pain.  She has known left knee DJD and we did perform an intra-articular corticosteroid injection for her on 9/9/2024.  She felt this worked quite well for her up until recently.  Her pain is since returned become progressively worse.  Considering this, she would like to have a repeat injection performed today.  No distal complaints.    9/9/24: Patient returns today for left knee pain.  He did perform a left knee corticosteroid injection for her on 2/29/2024.  She felt that this worked quite well up until recently.  Her pain is since returned to become progressively worse.  Consider this, she would like to repeat injection form today.  No additional complaints.    2/29/24: Patient returns today for left knee pain.  We did perform a left knee corticosteroid injection for her on 12/7/2023.  She felt that it worked quite well and would like to have a repeat injection today.  No additional complaints.    12/7/23: Patient turns today for left knee pain.  She has known left knee DJD and we did perform a left knee aspiration and injection on 9/14/2023.  She felt this worked quite well for her and would like to have a repeat injection performed today.  No additional complaints this time.    9/14/23: Patient returns today for left knee pain. We did perform a left knee aspiration and injection on 6/15/2023. She felt this worked quite well for her up until recently. She like to have a repeat injection performed today. She denies any further injuries. She has no additional complaints at this time.    6/15/23: Patient turns today for left knee pain. We performed a left knee aspiration and injection on 3/16/2023 that worked quite well for her. She presents today for repeat injection. She denies any additional injuries. She has no additional complaints  today.    3/16/23: Patient returns today for left knee pain. She states previous injection she received on 12/15/2022 worked quite well until recently. She presents today requesting a repeat injection. She denies any further injuries. She has no additional complaints at this time.    12/15/22: Patient returns today for left knee pain. She states the preinjection she received on 9/22/2022 worked quite well up until recently. She presents today requesting repeat injection. She denies any further injuries. She has no additional complaints at this time.    9/22/22: 81-year-old female present known to me is presenting with complaint of chronic left knee pain. She has known moderate to severe left knee arthritis. She did have a knee aspiration and injection by Dr. Joseph on 5/18/2022 that worked quite well for her up until recently. She presents today requesting a repeat injection. She denies any interval injury. She describes the pain as deep and throbbing. She has no additional complaints today.     ROS: All pertinent positive symptoms are included in the history of present illness.    All other systems have been reviewed and are negative and noncontributory to this patient's current ailments.    Objective     There were no vitals filed for this visit.    Physical Exam  General/Constitutional: No apparent distress. Well-nourished and well developed.  Head: Normocephalic, Atraumatic.   Eyes: EOMI.  Vascular: No edema, swelling or tenderness, except as noted in detailed exam.  Respiratory: Non-labored breathing.  Integumentary: No impressive skin lesions present, except as noted in detailed exam.  Neurological: Oriented to person, place, and time.  Psychological: Normal mood and affect.  Musculoskeletal: Normal, except as noted in detailed exam.  Left knee  Flexion 120. Extension 5. Varus deformity present. Crepitus present with knee flexion/extension. Positive joint hypertrophy. Grade 2 effusion. No ecchymosis.  Muscle strength 5 out of 5 with flexion and extension. Lachman negative. Anterior drawer negative. Posterior drawer negative. Valgus stress negative. Varus stress negative. Kamla positive.     Patient ID: Nava Gutierrez is a 83 y.o. female.    L Inj/Asp: L knee on 12/9/2024 10:31 AM  Indications: pain and joint swelling  Details: 18 G needle, ultrasound-guided superolateral approach  Medications: 80 mg triamcinolone acetonide 40 mg/mL; 4 mL lidocaine 10 mg/mL (1 %)  Aspirate: 31 mL clear and yellow  Outcome: tolerated well, no immediate complications  Procedure, treatment alternatives, risks and benefits explained, specific risks discussed. Consent was given by the patient. Immediately prior to procedure a time out was called to verify the correct patient, procedure, equipment, support staff and site/side marked as required. Patient was prepped and draped in the usual sterile fashion.             Assessment/Plan   Problem List Items Addressed This Visit    None  Visit Diagnoses       Arthritis of left knee    -  Primary    Relevant Orders    Point of Care Ultrasound (Completed)          Considering that she has done well with previous corticosteroid injections, I feel that a repeat injection is warranted. Discussed risks versus benefits of having injection performed. Patient has elected to proceed with procedure. Please refer to procedure note above. Discussed with patient to limit weightbearing activities over the next 24-48 hours, they can then progress to full activities as tolerated. Discussed with patient to avoid water submersion over the next 2 days. Discussed with patient to call me immediately if they develop worsening pain, rash, erythema, or fevers. Patient should follow-up as needed if pain persists or worsens.    Boom Ponce, DO  Sports Medicine  [facility]     ** Please excuse any errors in grammar or translation related to this dictation. Voice recognition software was utilized to prepare  this document. **

## 2024-12-17 ENCOUNTER — APPOINTMENT (OUTPATIENT)
Dept: NEUROLOGY | Facility: CLINIC | Age: 84
End: 2024-12-17
Payer: MEDICARE

## 2024-12-17 VITALS
HEART RATE: 69 BPM | BODY MASS INDEX: 28.56 KG/M2 | DIASTOLIC BLOOD PRESSURE: 75 MMHG | RESPIRATION RATE: 18 BRPM | SYSTOLIC BLOOD PRESSURE: 144 MMHG | WEIGHT: 139 LBS

## 2024-12-17 DIAGNOSIS — G25.0 BENIGN FAMILIAL TREMOR: Primary | ICD-10-CM

## 2024-12-17 PROCEDURE — 3078F DIAST BP <80 MM HG: CPT | Performed by: PSYCHIATRY & NEUROLOGY

## 2024-12-17 PROCEDURE — 1036F TOBACCO NON-USER: CPT | Performed by: PSYCHIATRY & NEUROLOGY

## 2024-12-17 PROCEDURE — 1160F RVW MEDS BY RX/DR IN RCRD: CPT | Performed by: PSYCHIATRY & NEUROLOGY

## 2024-12-17 PROCEDURE — 1123F ACP DISCUSS/DSCN MKR DOCD: CPT | Performed by: PSYCHIATRY & NEUROLOGY

## 2024-12-17 PROCEDURE — 99213 OFFICE O/P EST LOW 20 MIN: CPT | Performed by: PSYCHIATRY & NEUROLOGY

## 2024-12-17 PROCEDURE — 1159F MED LIST DOCD IN RCRD: CPT | Performed by: PSYCHIATRY & NEUROLOGY

## 2024-12-17 PROCEDURE — 3077F SYST BP >= 140 MM HG: CPT | Performed by: PSYCHIATRY & NEUROLOGY

## 2024-12-17 ASSESSMENT — FAHN TOLOSA MARTIN TREMOR (FTM)
UE ARM AT REST: 1
HEAD AT POSTURE WHEN STANDING OR SITTING: 1
FACE AT POSTURE WHEN STANDING OR SITTING: 1
LE TOE TO FINGER IN A FLEXED POSTURE: 0
RUE TOTAL SCORE: 3
LE AT REST: 0
TRUNK  WHEN SITTING OR STANDING: 0
TOUNGE TOTAL SCORE: 0
UE ARMS OUTSTRECHED WRISTS MILDLY EXTENDED FINGERS SPREAD APART: 1
PART A SUBTOTAL SCORE: 12
FACE IN REPOSE: 1
FACE TOTAL: 2
LUE TOTAL SCORE: 4
UE FINGER TO NOSE AND OTHER ACTIONS: 1
UE FINGER TO NOSE AND OTHER ACTIONS: 2
LE LEG FLEXED AT HIPS AND KNEES AT POSTURE: 0
LE TOE TO FINGER IN A FLEXED POSTURE: 0
RLE TOTAL SCORE: 0
VOICE TOTAL SCORE: 1
TOUNGE WHEN PROTUDED: 0
TRUNK TOTAL SCORE: 0
HEAD IN REPOSE: 1
TRUNK IN REPOSE: 0
VOICE IN ACTION: 1
HEAD TOTAL SCORE: 2
LLE TOTAL SCORE: 0
LE LEG FLEXED AT HIPS AND KNEES AT POSTURE: 0
LE AT REST: 0
UE ARMS OUTSTRECHED WRISTS MILDLY EXTENDED FINGERS SPREAD APART: 1
UE ARM AT REST: 1
TOUNGE AT REST: 0

## 2024-12-17 ASSESSMENT — PATIENT HEALTH QUESTIONNAIRE - PHQ9
SUM OF ALL RESPONSES TO PHQ9 QUESTIONS 1 AND 2: 0
1. LITTLE INTEREST OR PLEASURE IN DOING THINGS: NOT AT ALL
2. FEELING DOWN, DEPRESSED OR HOPELESS: NOT AT ALL

## 2024-12-17 ASSESSMENT — ENCOUNTER SYMPTOMS
DEPRESSION: 0
TREMORS: 1
LOSS OF SENSATION IN FEET: 0
OCCASIONAL FEELINGS OF UNSTEADINESS: 1

## 2024-12-17 NOTE — PATIENT INSTRUCTIONS
Pleasure meeting you. As discussed the plan is as follows:  - Continue the Primidone, Topamax and Propranolol at same dose since the higher doses cause you to have dizziness  - The Gabapentin you get from you other provider can also help with the tremors so we recommend you continue that  - Try using the weighted utensils. You can get it from [employer] or from [employer]  - Come back to see us in 1 year for follow up or contact us sooner if some thing concerns you.

## 2024-12-17 NOTE — PROGRESS NOTES
"Subjective     Nava Gutierrez is a 83 y.o. year old female who presents with Tremors, here for follow up visit.    Tremor  83 y.o. with hx of ET involving face, head and arms. Unable to uptitrate medications further for tremor control due to side effects. Not interested in DBS/MRIgFUS and Betsy trio too expensive.    ET, last visit 6/2024.    Tremor unchanged. Not bothersome when asked about interference w/ ADLs, says she does not let it bother her, is used to it.  Balance: \"OK\", cane PRN when out of the home. Unchanged since last visit  Falls: None    Denies new medical issues except shingles in 10/2023 for which she is on gabapentin had dental implants 3 weeks ago.    Folate wnl 1/15/24  Liver enzymes wnl 1/11/24  CBC 1/11/24 w/ decrease H&H, MCH and MCHC, elevated RDW. The platelets, RBC and WBC wnl      Current ET Meds:  Primidone 250mg 2 times a day (higher doses caused sedation)  Propranolol ER 60mg daily ((PCP prescribes))  Topiramate 50mg 1 tab 2 times a day  SE: None currently      Past ET Meds:  Gabapentin- significant fatigue but on currently for hx shingles      Patient Health Questionnaire-2 Score: 0            Current Outpatient Medications:     albuterol 2.5 mg /3 mL (0.083 %) nebulizer solution, USE 1 UNIT DOSE EVERY 4-6 HOURS AS NEEDED FOR WHEEZING, Disp: 75 mL, Rfl: 1    beclomethasone dipropionate (Qvar) 80 mcg/actuation inhaler, Inhale 1 Inhalation 2 times a day. Rinse mouth with water after use to reduce aftertaste and incidence of candidiasis. Do not swallow., Disp: 10.6 g, Rfl: 5    CALCIUM ORAL, Calcium TABS, Disp: , Rfl:     cholecalciferol (Vitamin D-3) 50 mcg (2,000 unit) capsule, take 1 capsule by mouth once daily, Disp: , Rfl:     fluticasone (Flovent) 110 mcg/actuation inhaler, Inhale 1 puff 2 times a day. Rinse mouth with water after use to reduce aftertaste and incidence of candidiasis. Do not swallow. (Patient not taking: Reported on 11/21/2024), Disp: 12 g, Rfl: 5    folic acid " (Folvite) 1 mg tablet, Take 1 tablet (1 mg) by mouth once daily., Disp: 90 tablet, Rfl: 3    gabapentin (Neurontin) 300 mg capsule, Take 1 capsule (300 mg) by mouth once daily at bedtime., Disp: 90 capsule, Rfl: 0    latanoprost (Xalatan) 0.005 % ophthalmic solution, , Disp: , Rfl:     magnesium oxide 400 mg magnesium capsule, TAKE 1 CAPSULE Daily, Disp: , Rfl:     NIFEdipine ER (NIFEdipine XL) 90 mg 24 hr tablet, Take 1 tablet (90 mg) by mouth once daily. DO NOT CRUSH CHEW OR SPLIT, Disp: 90 tablet, Rfl: 0    omeprazole (PriLOSEC) 40 mg DR capsule, TAKE 1 CAPSULE BY MOUTH DAILY IN THE MORNING BEFORE A MEAL, Disp: 90 capsule, Rfl: 0    pravastatin (Pravachol) 20 mg tablet, Take 1 tablet (20 mg) by mouth once daily., Disp: 90 tablet, Rfl: 0    primidone (Mysoline) 250 mg tablet, Take 1 tablet (250 mg) by mouth 2 times a day., Disp: 180 tablet, Rfl: 0    propranolol LA (Inderal LA) 60 mg 24 hr capsule, Take 1 capsule (60 mg) by mouth once daily., Disp: 90 capsule, Rfl: 0    sodium chloride 1,000 mg tablet, Take 1 tablet (1 g) by mouth once daily., Disp: 90 tablet, Rfl: 1    topiramate (Topamax) 50 mg tablet, Take 1 tablet (50 mg) by mouth 2 times a day., Disp: 180 tablet, Rfl: 3    valsartan (Diovan) 320 mg tablet, Take 1 tablet (320 mg) by mouth once daily., Disp: 90 tablet, Rfl: 0       Objective   Vitals:    12/17/24 1057   BP: 144/75   BP Location: Left arm   Patient Position: Sitting   BP Cuff Size: Large adult   Pulse: 69   Resp: 18   Weight: 63 kg (139 lb)         Physical Exam    No bradykinesia on finger taps,hand opening, toe taps or heel stomps  No rigidity  No postural instability  Gait is normal without cane, stand is normal      Office Visit from 12/17/2024 in [facility] with Kyler Hollis MD     12/17/2024    1221   PART A     Face at rest 1   Face at posture 1   Face Total 2   Tongue at rest 0   Tongue at posture 0   Tongue total 0   Voice in action 1   Voice total 1   Head at rest 1    Head at posture 1   Head total 2   RUE at rest 1   RUE at posture 1   RUE in action 1   RUE total 3   LUE at rest 1   LUE at posture 1   LUE in action 2   LUE total 4   Trunk at rest 0   Trunk at posture 0   Trunk total 0   RLE at rest 0   RLE at posture 0   RLE in action 0   RLE total 0   LLE at rest 0   LLE at posture 0   LLE in action 0   LLE total 0   Part A subtotal 12     Assessment/Plan   MsNikos Gutierrez is a 83 y.o. F  with ET (at least 2013 from our EMR) with severe tremors involving face, head, arms. Previously negative Radha scan. Medication up-titration is limited by sedation for primidone and gabapentin. Propranolol titration is limited by COPD/asthma, benzodiazepines are not indicated due to age and sedation.    She is not interested in DBS or MRIgFUS.     Calatrio watch was slightly helpful and too expensive so she no longer has it.     CMP and CBC in July stable       Diagnoses and all orders for this visit:  Benign familial tremor    - Continue the Primidone, Topamax and Propranolol at same dose since the higher doses cause you to have dizziness  - The Gabapentin you get from you other provider can also help with the tremors so we recommend you continue that  - Try using the weighted utensils. You can get it from [employer] or from [employer]  - Come back to see us in 1 year for follow up or contact us sooner if some thing concerns you.

## 2025-01-06 DIAGNOSIS — R35.0 FREQUENCY OF URINATION: ICD-10-CM

## 2025-01-09 ENCOUNTER — LAB (OUTPATIENT)
Dept: LAB | Facility: LAB | Age: 85
End: 2025-01-09
Payer: MEDICARE

## 2025-01-09 DIAGNOSIS — N30.00 ACUTE CYSTITIS WITHOUT HEMATURIA: Primary | ICD-10-CM

## 2025-01-09 DIAGNOSIS — R35.0 FREQUENCY OF URINATION: ICD-10-CM

## 2025-01-09 LAB
APPEARANCE UR: CLEAR
BILIRUB UR STRIP.AUTO-MCNC: NEGATIVE MG/DL
COLOR UR: ABNORMAL
GLUCOSE UR STRIP.AUTO-MCNC: NORMAL MG/DL
KETONES UR STRIP.AUTO-MCNC: NEGATIVE MG/DL
LEUKOCYTE ESTERASE UR QL STRIP.AUTO: ABNORMAL
NITRITE UR QL STRIP.AUTO: NEGATIVE
PH UR STRIP.AUTO: 7 [PH]
PROT UR STRIP.AUTO-MCNC: NEGATIVE MG/DL
RBC # UR STRIP.AUTO: NEGATIVE /UL
RBC #/AREA URNS AUTO: NORMAL /HPF
SP GR UR STRIP.AUTO: 1.01
SQUAMOUS #/AREA URNS AUTO: NORMAL /HPF
UROBILINOGEN UR STRIP.AUTO-MCNC: NORMAL MG/DL
WBC #/AREA URNS AUTO: NORMAL /HPF

## 2025-01-09 PROCEDURE — 87086 URINE CULTURE/COLONY COUNT: CPT

## 2025-01-09 PROCEDURE — 81001 URINALYSIS AUTO W/SCOPE: CPT

## 2025-01-09 RX ORDER — CEPHALEXIN 500 MG/1
500 CAPSULE ORAL 2 TIMES DAILY
Qty: 14 CAPSULE | Refills: 0 | Status: SHIPPED | OUTPATIENT
Start: 2025-01-09 | End: 2025-01-16

## 2025-01-11 LAB — BACTERIA UR CULT: NORMAL

## 2025-02-27 ENCOUNTER — HOSPITAL ENCOUNTER (OUTPATIENT)
Dept: RADIOLOGY | Facility: CLINIC | Age: 85
Discharge: HOME | End: 2025-02-27
Payer: MEDICARE

## 2025-02-27 VITALS — BODY MASS INDEX: 28.02 KG/M2 | WEIGHT: 139 LBS | HEIGHT: 59 IN

## 2025-02-27 DIAGNOSIS — I10 HTN (HYPERTENSION), BENIGN: ICD-10-CM

## 2025-02-27 DIAGNOSIS — E78.00 PURE HYPERCHOLESTEROLEMIA: ICD-10-CM

## 2025-02-27 DIAGNOSIS — Z12.31 VISIT FOR SCREENING MAMMOGRAM: ICD-10-CM

## 2025-02-27 DIAGNOSIS — G25.0 BENIGN FAMILIAL TREMOR: ICD-10-CM

## 2025-02-27 PROCEDURE — 77063 BREAST TOMOSYNTHESIS BI: CPT | Performed by: RADIOLOGY

## 2025-02-27 PROCEDURE — 77067 SCR MAMMO BI INCL CAD: CPT

## 2025-02-27 PROCEDURE — 77067 SCR MAMMO BI INCL CAD: CPT | Performed by: RADIOLOGY

## 2025-02-27 RX ORDER — PRAVASTATIN SODIUM 20 MG/1
20 TABLET ORAL DAILY
Qty: 100 TABLET | Refills: 0 | Status: SHIPPED | OUTPATIENT
Start: 2025-02-27

## 2025-02-27 RX ORDER — PROPRANOLOL HYDROCHLORIDE 60 MG/1
60 CAPSULE, EXTENDED RELEASE ORAL DAILY
Qty: 100 CAPSULE | Refills: 0 | Status: SHIPPED | OUTPATIENT
Start: 2025-02-27

## 2025-02-27 RX ORDER — NIFEDIPINE 90 MG/1
90 TABLET, EXTENDED RELEASE ORAL DAILY
Qty: 100 TABLET | Refills: 0 | Status: SHIPPED | OUTPATIENT
Start: 2025-02-27

## 2025-03-20 ENCOUNTER — APPOINTMENT (OUTPATIENT)
Dept: HEMATOLOGY/ONCOLOGY | Facility: HOSPITAL | Age: 85
End: 2025-03-20
Payer: MEDICARE

## 2025-03-24 ENCOUNTER — HOSPITAL ENCOUNTER (OUTPATIENT)
Dept: RADIOLOGY | Facility: EXTERNAL LOCATION | Age: 85
Discharge: HOME | End: 2025-03-24

## 2025-03-24 ENCOUNTER — OFFICE VISIT (OUTPATIENT)
Dept: ORTHOPEDIC SURGERY | Facility: HOSPITAL | Age: 85
End: 2025-03-24
Payer: MEDICARE

## 2025-03-24 DIAGNOSIS — M17.12 PRIMARY OSTEOARTHRITIS OF LEFT KNEE: Primary | ICD-10-CM

## 2025-03-24 PROCEDURE — 1123F ACP DISCUSS/DSCN MKR DOCD: CPT | Performed by: EMERGENCY MEDICINE

## 2025-03-24 PROCEDURE — 2500000004 HC RX 250 GENERAL PHARMACY W/ HCPCS (ALT 636 FOR OP/ED): Performed by: EMERGENCY MEDICINE

## 2025-03-24 PROCEDURE — 20611 DRAIN/INJ JOINT/BURSA W/US: CPT | Mod: LT | Performed by: EMERGENCY MEDICINE

## 2025-03-24 PROCEDURE — 99214 OFFICE O/P EST MOD 30 MIN: CPT | Mod: 25 | Performed by: EMERGENCY MEDICINE

## 2025-03-24 PROCEDURE — 99214 OFFICE O/P EST MOD 30 MIN: CPT | Performed by: EMERGENCY MEDICINE

## 2025-03-24 RX ORDER — LIDOCAINE HYDROCHLORIDE 10 MG/ML
4 INJECTION, SOLUTION INFILTRATION; PERINEURAL
Status: COMPLETED | OUTPATIENT
Start: 2025-03-24 | End: 2025-03-24

## 2025-03-24 RX ORDER — TRIAMCINOLONE ACETONIDE 40 MG/ML
80 INJECTION, SUSPENSION INTRA-ARTICULAR; INTRAMUSCULAR
Status: COMPLETED | OUTPATIENT
Start: 2025-03-24 | End: 2025-03-24

## 2025-03-24 RX ADMIN — LIDOCAINE HYDROCHLORIDE 4 ML: 10 INJECTION, SOLUTION INFILTRATION; PERINEURAL at 10:35

## 2025-03-24 RX ADMIN — TRIAMCINOLONE ACETONIDE 80 MG: 200 INJECTION, SUSPENSION INTRA-ARTICULAR; INTRAMUSCULAR at 10:35

## 2025-03-24 ASSESSMENT — ENCOUNTER SYMPTOMS: KNEE DEFORMITY: 1

## 2025-03-24 NOTE — PROGRESS NOTES
Subjective   Nava Gutierrez is a 84 y.o. female who presents for Follow-up of the Left Knee (DOLI: 12/9/24/)    Left Knee       3/24/25: Patient returns today for left knee pain.  She has known left knee DJD and we did perform an intra-articular corticosteroid injection for her on 12/9/2024.  She felt this worked well up until recently.  Her pain is since returned and become progressively worse.  Considering this, she would like to have a repeat injection today.  No additional complaints at this time.    12/9/24: Patient returns today for left knee pain.  She has known left knee DJD and we did perform an intra-articular corticosteroid injection for her on 9/9/2024.  She felt this worked quite well for her up until recently.  Her pain is since returned become progressively worse.  Considering this, she would like to have a repeat injection performed today.  No distal complaints.    9/9/24: Patient returns today for left knee pain.  He did perform a left knee corticosteroid injection for her on 2/29/2024.  She felt that this worked quite well up until recently.  Her pain is since returned to become progressively worse.  Consider this, she would like to repeat injection form today.  No additional complaints.    2/29/24: Patient returns today for left knee pain.  We did perform a left knee corticosteroid injection for her on 12/7/2023.  She felt that it worked quite well and would like to have a repeat injection today.  No additional complaints.    12/7/23: Patient turns today for left knee pain.  She has known left knee DJD and we did perform a left knee aspiration and injection on 9/14/2023.  She felt this worked quite well for her and would like to have a repeat injection performed today.  No additional complaints this time.    9/14/23: Patient returns today for left knee pain. We did perform a left knee aspiration and injection on 6/15/2023. She felt this worked quite well for her up until recently. She like to have  a repeat injection performed today. She denies any further injuries. She has no additional complaints at this time.    6/15/23: Patient turns today for left knee pain. We performed a left knee aspiration and injection on 3/16/2023 that worked quite well for her. She presents today for repeat injection. She denies any additional injuries. She has no additional complaints today.    3/16/23: Patient returns today for left knee pain. She states previous injection she received on 12/15/2022 worked quite well until recently. She presents today requesting a repeat injection. She denies any further injuries. She has no additional complaints at this time.    12/15/22: Patient returns today for left knee pain. She states the preinjection she received on 9/22/2022 worked quite well up until recently. She presents today requesting repeat injection. She denies any further injuries. She has no additional complaints at this time.    9/22/22: 81-year-old female present known to me is presenting with complaint of chronic left knee pain. She has known moderate to severe left knee arthritis. She did have a knee aspiration and injection by Dr. Joseph on 5/18/2022 that worked quite well for her up until recently. She presents today requesting a repeat injection. She denies any interval injury. She describes the pain as deep and throbbing. She has no additional complaints today.     ROS: All pertinent positive symptoms are included in the history of present illness.    All other systems have been reviewed and are negative and noncontributory to this patient's current ailments.    Objective     There were no vitals filed for this visit.    Physical Exam  General/Constitutional: No apparent distress. Well-nourished and well developed.  Head: Normocephalic, Atraumatic.   Eyes: EOMI.  Vascular: No edema, swelling or tenderness, except as noted in detailed exam.  Respiratory: Non-labored breathing.  Integumentary: No impressive skin  lesions present, except as noted in detailed exam.  Neurological: Oriented to person, place, and time.  Psychological: Normal mood and affect.  Musculoskeletal: Normal, except as noted in detailed exam.  Left knee  Flexion 120. Extension 5. Varus deformity present. Crepitus present with knee flexion/extension. Positive joint hypertrophy. Grade 2 effusion. No ecchymosis. Muscle strength 5 out of 5 with flexion and extension. Lachman negative. Anterior drawer negative. Posterior drawer negative. Valgus stress negative. Varus stress negative. Kamla positive.     Patient ID: Nava Gutierrez is a 84 y.o. female.    L Inj/Asp: L knee on 3/24/2025 10:35 AM  Indications: pain and joint swelling  Details: 18 G needle, ultrasound-guided superolateral approach  Medications: 80 mg triamcinolone acetonide 40 mg/mL; 4 mL lidocaine 10 mg/mL (1 %)  Aspirate: 36 mL clear and yellow  Outcome: tolerated well, no immediate complications  Procedure, treatment alternatives, risks and benefits explained, specific risks discussed. Consent was given by the patient. Immediately prior to procedure a time out was called to verify the correct patient, procedure, equipment, support staff and site/side marked as required. Patient was prepped and draped in the usual sterile fashion.             Assessment/Plan   Problem List Items Addressed This Visit       Primary osteoarthritis of left knee    Relevant Orders    Point of Care Ultrasound (Completed)     Considering that she has done well with previous corticosteroid injections, I feel that a repeat injection is warranted. Discussed risks versus benefits of having injection performed. Patient has elected to proceed with procedure. Please refer to procedure note above. Discussed with patient to limit weightbearing activities over the next 24-48 hours, they can then progress to full activities as tolerated. Discussed with patient to avoid water submersion over the next 2 days. Discussed with  patient to call me immediately if they develop worsening pain, rash, erythema, or fevers. Patient should follow-up as needed if pain persists or worsens.    Boom Ponce, [facility]     ** Please excuse any errors in grammar or translation related to this dictation. Voice recognition software was utilized to prepare this document. **

## 2025-04-02 ENCOUNTER — APPOINTMENT (OUTPATIENT)
Dept: PRIMARY CARE | Facility: CLINIC | Age: 85
End: 2025-04-02
Payer: MEDICARE

## 2025-04-02 ENCOUNTER — TELEPHONE (OUTPATIENT)
Dept: PRIMARY CARE | Facility: CLINIC | Age: 85
End: 2025-04-02

## 2025-04-02 VITALS
DIASTOLIC BLOOD PRESSURE: 70 MMHG | HEIGHT: 59 IN | HEART RATE: 73 BPM | OXYGEN SATURATION: 97 % | BODY MASS INDEX: 28.83 KG/M2 | WEIGHT: 143 LBS | SYSTOLIC BLOOD PRESSURE: 170 MMHG

## 2025-04-02 DIAGNOSIS — G25.0 BENIGN FAMILIAL TREMOR: ICD-10-CM

## 2025-04-02 DIAGNOSIS — R73.9 ELEVATED BLOOD SUGAR: ICD-10-CM

## 2025-04-02 DIAGNOSIS — I10 HTN (HYPERTENSION), BENIGN: ICD-10-CM

## 2025-04-02 DIAGNOSIS — J44.9 CHRONIC OBSTRUCTIVE PULMONARY DISEASE, UNSPECIFIED COPD TYPE (MULTI): ICD-10-CM

## 2025-04-02 DIAGNOSIS — K21.9 GASTROESOPHAGEAL REFLUX DISEASE WITHOUT ESOPHAGITIS: ICD-10-CM

## 2025-04-02 DIAGNOSIS — E87.1 HYPONATREMIA: ICD-10-CM

## 2025-04-02 DIAGNOSIS — R94.6 ABNORMAL THYROID EXAM: ICD-10-CM

## 2025-04-02 DIAGNOSIS — B02.29 POST HERPETIC NEURALGIA: ICD-10-CM

## 2025-04-02 DIAGNOSIS — E78.00 PURE HYPERCHOLESTEROLEMIA: Primary | ICD-10-CM

## 2025-04-02 PROCEDURE — 1159F MED LIST DOCD IN RCRD: CPT | Performed by: FAMILY MEDICINE

## 2025-04-02 PROCEDURE — G2211 COMPLEX E/M VISIT ADD ON: HCPCS | Performed by: FAMILY MEDICINE

## 2025-04-02 PROCEDURE — 3077F SYST BP >= 140 MM HG: CPT | Performed by: FAMILY MEDICINE

## 2025-04-02 PROCEDURE — 1123F ACP DISCUSS/DSCN MKR DOCD: CPT | Performed by: FAMILY MEDICINE

## 2025-04-02 PROCEDURE — 3078F DIAST BP <80 MM HG: CPT | Performed by: FAMILY MEDICINE

## 2025-04-02 PROCEDURE — 99214 OFFICE O/P EST MOD 30 MIN: CPT | Performed by: FAMILY MEDICINE

## 2025-04-02 PROCEDURE — 1160F RVW MEDS BY RX/DR IN RCRD: CPT | Performed by: FAMILY MEDICINE

## 2025-04-02 PROCEDURE — 1036F TOBACCO NON-USER: CPT | Performed by: FAMILY MEDICINE

## 2025-04-02 RX ORDER — PRAVASTATIN SODIUM 20 MG/1
20 TABLET ORAL DAILY
Qty: 90 TABLET | Refills: 1 | Status: SHIPPED | OUTPATIENT
Start: 2025-04-02

## 2025-04-02 RX ORDER — SODIUM CHLORIDE 1000 MG
1 TABLET, SOLUBLE MISCELLANEOUS DAILY
Qty: 90 TABLET | Refills: 1 | Status: SHIPPED | OUTPATIENT
Start: 2025-04-02

## 2025-04-02 RX ORDER — PROPRANOLOL HYDROCHLORIDE 60 MG/1
60 CAPSULE, EXTENDED RELEASE ORAL DAILY
Qty: 90 CAPSULE | Refills: 1 | Status: SHIPPED | OUTPATIENT
Start: 2025-04-02

## 2025-04-02 RX ORDER — VALSARTAN 320 MG/1
320 TABLET ORAL DAILY
Qty: 90 TABLET | Refills: 1 | Status: SHIPPED | OUTPATIENT
Start: 2025-04-02

## 2025-04-02 RX ORDER — PRIMIDONE 250 MG/1
250 TABLET ORAL 2 TIMES DAILY
Qty: 180 TABLET | Refills: 1 | Status: SHIPPED | OUTPATIENT
Start: 2025-04-02

## 2025-04-02 RX ORDER — OMEPRAZOLE 40 MG/1
CAPSULE, DELAYED RELEASE ORAL
Qty: 90 CAPSULE | Refills: 1 | Status: SHIPPED | OUTPATIENT
Start: 2025-04-02

## 2025-04-02 RX ORDER — GABAPENTIN 300 MG/1
300 CAPSULE ORAL NIGHTLY
Qty: 90 CAPSULE | Refills: 1 | Status: SHIPPED | OUTPATIENT
Start: 2025-04-02

## 2025-04-02 RX ORDER — NIFEDIPINE 90 MG/1
90 TABLET, EXTENDED RELEASE ORAL DAILY
Qty: 90 TABLET | Refills: 1 | Status: SHIPPED | OUTPATIENT
Start: 2025-04-02

## 2025-04-02 ASSESSMENT — PATIENT HEALTH QUESTIONNAIRE - PHQ9
2. FEELING DOWN, DEPRESSED OR HOPELESS: NOT AT ALL
1. LITTLE INTEREST OR PLEASURE IN DOING THINGS: NOT AT ALL
SUM OF ALL RESPONSES TO PHQ9 QUESTIONS 1 AND 2: 0

## 2025-04-02 NOTE — ASSESSMENT & PLAN NOTE
Continue nifedipine and valsartan . Check Home Bps, not sure why high today  Orders:    Follow Up In [facility] - PCP - [facility]    valsartan (Diovan) 320 mg tablet; Take 1 tablet (320 mg) by mouth once daily.    NIFEdipine ER (NIFEdipine XL) 90 mg 24 hr tablet; Take 1 tablet (90 mg) by mouth once daily. DO NOT CRUSH CHEW OR SPLIT    Follow Up In [facility] - PCP - Medicare Annual; Future

## 2025-04-02 NOTE — PROGRESS NOTES
"Subjective   Patient ID: Nava Gutierrez is a 84 y.o. female who presents for Follow-up.    HPI     The patient reports that she is taking nifedipine and valsartan for hypertension, omeprazole for GERD, pravastatin for hyperlipidemia, gabapentin for post hepatic neuralgia pain, Flovent as well as Albuterol as needed for asthma, sodium tablets for hyponatremia and Primidone, Topamax and propanolol for her essential tremors. She is taking these medications as prescribed and denies having any side effects from them. She saw the hematologist in 11/2024 for anemia. She has seen Dr. Ponce on 3/14/2025 for left knee osteoarthritis.     Her blood pressure is elevated today in clinic.  She states she takes her BP medication religiously. She does not check her BP at home.    Denies any falls in the last year.  No depression over the last few weeks.  She is doing well without any complaints or concerns.  She is planning a trip in the summer.     Labs reviewed. Kidney function is stable.     Review of Systems  Constitutional: No fever or chills  Cardiovascular: no chest pain, no palpitations and no syncope.   Respiratory: no cough, no shortness of breath during exertion and no shortness of breath at rest.   Gastrointestinal: no abdominal pain, no nausea and no vomiting.  Neuro: No Headache, no dizziness    Objective   /70   Pulse 73   Ht 1.486 m (4' 10.5\")   Wt 64.9 kg (143 lb)   SpO2 97%   BMI 29.38 kg/m²     Physical Exam  Constitutional: Alert and in no acute distress. Well developed, well nourished  Head and Face: Head and face: Normal.    Cardiovascular: Heart rate and rhythm were normal, normal S1 and S2. No peripheral edema.   Pulmonary: No respiratory distress. Clear bilateral breath sounds.  Musculoskeletal: Gait and station: Normal. Muscle strength/tone: Normal.   Skin: Normal skin color and pigmentation, normal skin turgor, and no rash.    Psychiatric: Judgment and insight: Intact. Mood and affect: " Normal.      Lab Results   Component Value Date    WBC 5.9 11/21/2024    HGB 9.7 (L) 11/21/2024    HCT 31.8 (L) 11/21/2024     11/21/2024    CHOL 199 07/08/2024    TRIG 308 (H) 07/08/2024    HDL 42.7 07/08/2024    ALT 9 11/21/2024    AST 15 11/21/2024     11/21/2024    K 4.7 11/21/2024     11/21/2024    CREATININE 0.93 11/21/2024    BUN 25 (H) 11/21/2024    CO2 27 11/21/2024    TSH 3.78 07/08/2024    INR 1.1 01/31/2018    HGBA1C 5.9 (H) 07/08/2024       Point of Care Ultrasound  These images are not reportable by radiology and will not be interpreted   by  Radiologists.      Assessment/Plan   Assessment & Plan  HTN (hypertension), benign  Continue nifedipine and valsartan . Check Home Bps, not sure why high today  Orders:    Follow Up In [facility] - PCP - Established    valsartan (Diovan) 320 mg tablet; Take 1 tablet (320 mg) by mouth once daily.    NIFEdipine ER (NIFEdipine XL) 90 mg 24 hr tablet; Take 1 tablet (90 mg) by mouth once daily. DO NOT CRUSH CHEW OR SPLIT    Follow Up In [facility] - PCP - Medicare Annual; Future    Pure hypercholesterolemia  Continue pravastatin   Orders:    pravastatin (Pravachol) 20 mg tablet; Take 1 tablet (20 mg) by mouth once daily.    Hyponatremia  Continue sodium chloride 1000 mg.   Orders:    sodium chloride 1,000 mg tablet; Take 1 tablet (1 g) by mouth once daily.    Benign familial tremor  Continue to see neurology and patient is on primidone and propranolol   Orders:    propranolol LA (Inderal LA) 60 mg 24 hr capsule; Take 1 capsule (60 mg) by mouth once daily.    primidone (Mysoline) 250 mg tablet; Take 1 tablet (250 mg) by mouth 2 times a day.    Gastroesophageal reflux disease without esophagitis  Stable on omeprazole   Orders:    omeprazole (PriLOSEC) 40 mg DR capsule; TAKE 1 CAPSULE BY MOUTH DAILY IN THE MORNING BEFORE A MEAL    Post herpetic neuralgia  Symptoms are stable on gabapentin   Orders:    gabapentin (Neurontin) 300  mg capsule; Take 1 capsule (300 mg) by mouth once daily at bedtime.    Chronic obstructive pulmonary disease, unspecified COPD type (Multi)  Referred to Clinical Pharmacy  Orders:    Referral to Clinical Pharmacy; Future              Your yearly Physical is due in:  July 2025  When you call the office for your yearly Physical, please ask them to inform me to order your blood work, so that you can get the fasting blood work before your appointment and we can discuss the results at your physical.      Please call me if any questions arise from now until your next visit. I will call you after I am done seeing patients. A Doctor is always available by phone when the office is closed. Please feel free to call for help with any problem that you feel shouldn't wait until the office re-opens.     Scribe Attestation  By signing my name below, ITomy Scribe   attest that this documentation has been prepared under the direction and in the presence of Terra Gray MD.

## 2025-04-02 NOTE — ASSESSMENT & PLAN NOTE
Continue to see neurology and patient is on primidone and propranolol   Orders:    propranolol LA (Inderal LA) 60 mg 24 hr capsule; Take 1 capsule (60 mg) by mouth once daily.    primidone (Mysoline) 250 mg tablet; Take 1 tablet (250 mg) by mouth 2 times a day.

## 2025-04-02 NOTE — ASSESSMENT & PLAN NOTE
Continue sodium chloride 1000 mg.   Orders:    sodium chloride 1,000 mg tablet; Take 1 tablet (1 g) by mouth once daily.

## 2025-04-06 DIAGNOSIS — G25.0 BENIGN FAMILIAL TREMOR: ICD-10-CM

## 2025-04-07 RX ORDER — TOPIRAMATE 50 MG/1
50 TABLET, FILM COATED ORAL 2 TIMES DAILY
Qty: 720 TABLET | Refills: 0 | Status: SHIPPED | OUTPATIENT
Start: 2025-04-07 | End: 2026-04-07

## 2025-04-10 ENCOUNTER — LAB (OUTPATIENT)
Dept: LAB | Facility: HOSPITAL | Age: 85
End: 2025-04-10
Payer: MEDICARE

## 2025-04-10 ENCOUNTER — OFFICE VISIT (OUTPATIENT)
Dept: HEMATOLOGY/ONCOLOGY | Facility: HOSPITAL | Age: 85
End: 2025-04-10
Payer: MEDICARE

## 2025-04-10 ENCOUNTER — APPOINTMENT (OUTPATIENT)
Dept: PHARMACY | Facility: HOSPITAL | Age: 85
End: 2025-04-10
Payer: MEDICARE

## 2025-04-10 VITALS
WEIGHT: 143 LBS | HEART RATE: 80 BPM | OXYGEN SATURATION: 97 % | BODY MASS INDEX: 29.38 KG/M2 | TEMPERATURE: 99.3 F | DIASTOLIC BLOOD PRESSURE: 59 MMHG | RESPIRATION RATE: 17 BRPM | SYSTOLIC BLOOD PRESSURE: 154 MMHG

## 2025-04-10 DIAGNOSIS — D50.9 MICROCYTIC ANEMIA: ICD-10-CM

## 2025-04-10 DIAGNOSIS — J45.20 MILD INTERMITTENT ASTHMA, UNSPECIFIED WHETHER COMPLICATED (HHS-HCC): ICD-10-CM

## 2025-04-10 DIAGNOSIS — D63.1 ANEMIA OF CHRONIC RENAL FAILURE, UNSPECIFIED CKD STAGE: ICD-10-CM

## 2025-04-10 DIAGNOSIS — N18.9 ANEMIA OF CHRONIC RENAL FAILURE, UNSPECIFIED CKD STAGE: ICD-10-CM

## 2025-04-10 DIAGNOSIS — D72.820 LYMPHOCYTOSIS: ICD-10-CM

## 2025-04-10 DIAGNOSIS — D56.3 ALPHA THALASSEMIA TRAIT: ICD-10-CM

## 2025-04-10 DIAGNOSIS — J44.9 CHRONIC OBSTRUCTIVE PULMONARY DISEASE, UNSPECIFIED COPD TYPE (MULTI): ICD-10-CM

## 2025-04-10 DIAGNOSIS — D63.8 ANEMIA OF CHRONIC DISEASE: Primary | ICD-10-CM

## 2025-04-10 DIAGNOSIS — D63.8 ANEMIA OF CHRONIC DISEASE: ICD-10-CM

## 2025-04-10 LAB
ALBUMIN SERPL BCP-MCNC: 4.2 G/DL (ref 3.4–5)
ALP SERPL-CCNC: 86 U/L (ref 33–136)
ALT SERPL W P-5'-P-CCNC: 13 U/L (ref 7–45)
ANION GAP SERPL CALC-SCNC: 10 MMOL/L (ref 10–20)
AST SERPL W P-5'-P-CCNC: 17 U/L (ref 9–39)
BASOPHILS # BLD AUTO: 0.05 X10*3/UL (ref 0–0.1)
BASOPHILS NFR BLD AUTO: 0.9 %
BILIRUB SERPL-MCNC: 0.3 MG/DL (ref 0–1.2)
BUN SERPL-MCNC: 18 MG/DL (ref 6–23)
CALCIUM SERPL-MCNC: 9.2 MG/DL (ref 8.6–10.6)
CHLORIDE SERPL-SCNC: 107 MMOL/L (ref 98–107)
CHOLEST SERPL-MCNC: 188 MG/DL (ref 0–199)
CHOLESTEROL/HDL RATIO: 3.7
CO2 SERPL-SCNC: 26 MMOL/L (ref 21–32)
CREAT SERPL-MCNC: 0.82 MG/DL (ref 0.5–1.05)
CREAT UR-MCNC: 58.5 MG/DL (ref 20–320)
CRP SERPL-MCNC: 2.64 MG/DL
EGFRCR SERPLBLD CKD-EPI 2021: 72 ML/MIN/1.73M*2
EOSINOPHIL # BLD AUTO: 0.12 X10*3/UL (ref 0–0.4)
EOSINOPHIL NFR BLD AUTO: 2.2 %
ERYTHROCYTE [DISTWIDTH] IN BLOOD BY AUTOMATED COUNT: 16.3 % (ref 11.5–14.5)
EST. AVERAGE GLUCOSE BLD GHB EST-MCNC: 120 MG/DL
FERRITIN SERPL-MCNC: 275 NG/ML (ref 8–150)
FOLATE SERPL-MCNC: >24 NG/ML
GLUCOSE SERPL-MCNC: 98 MG/DL (ref 74–99)
HBA1C MFR BLD: 5.8 %
HCT VFR BLD AUTO: 31.8 % (ref 36–46)
HDLC SERPL-MCNC: 50.5 MG/DL
HGB BLD-MCNC: 9.6 G/DL (ref 12–16)
IMM GRANULOCYTES # BLD AUTO: 0 X10*3/UL (ref 0–0.5)
IMM GRANULOCYTES NFR BLD AUTO: 0 % (ref 0–0.9)
IRON SATN MFR SERPL: 24 % (ref 25–45)
IRON SERPL-MCNC: 65 UG/DL (ref 35–150)
LDLC SERPL CALC-MCNC: 95 MG/DL
LYMPHOCYTES # BLD AUTO: 3.08 X10*3/UL (ref 0.8–3)
LYMPHOCYTES NFR BLD AUTO: 55.2 %
MCH RBC QN AUTO: 20.9 PG (ref 26–34)
MCHC RBC AUTO-ENTMCNC: 30.2 G/DL (ref 32–36)
MCV RBC AUTO: 69 FL (ref 80–100)
MICROALBUMIN UR-MCNC: 46.3 MG/L
MICROALBUMIN/CREAT UR: 79.1 UG/MG CREAT
MONOCYTES # BLD AUTO: 0.48 X10*3/UL (ref 0.05–0.8)
MONOCYTES NFR BLD AUTO: 8.6 %
NEUTROPHILS # BLD AUTO: 1.85 X10*3/UL (ref 1.6–5.5)
NEUTROPHILS NFR BLD AUTO: 33.1 %
NON HDL CHOLESTEROL: 138 MG/DL (ref 0–149)
NRBC BLD-RTO: 0 /100 WBCS (ref 0–0)
PLATELET # BLD AUTO: 225 X10*3/UL (ref 150–450)
POTASSIUM SERPL-SCNC: 4.2 MMOL/L (ref 3.5–5.3)
PROT SERPL-MCNC: 7.4 G/DL (ref 6.4–8.2)
PROT SERPL-MCNC: 7.8 G/DL (ref 6.4–8.2)
RBC # BLD AUTO: 4.6 X10*6/UL (ref 4–5.2)
SODIUM SERPL-SCNC: 139 MMOL/L (ref 136–145)
TIBC SERPL-MCNC: 269 UG/DL (ref 240–445)
TRIGL SERPL-MCNC: 215 MG/DL (ref 0–149)
TSH SERPL-ACNC: 3.81 MIU/L (ref 0.44–3.98)
UIBC SERPL-MCNC: 204 UG/DL (ref 110–370)
VIT B12 SERPL-MCNC: 547 PG/ML (ref 211–911)
VLDL: 43 MG/DL (ref 0–40)
WBC # BLD AUTO: 5.6 X10*3/UL (ref 4.4–11.3)

## 2025-04-10 PROCEDURE — 1123F ACP DISCUSS/DSCN MKR DOCD: CPT

## 2025-04-10 PROCEDURE — 99215 OFFICE O/P EST HI 40 MIN: CPT

## 2025-04-10 PROCEDURE — RXMED WILLOW AMBULATORY MEDICATION CHARGE

## 2025-04-10 PROCEDURE — 82668 ASSAY OF ERYTHROPOIETIN: CPT

## 2025-04-10 PROCEDURE — 80053 COMPREHEN METABOLIC PANEL: CPT

## 2025-04-10 PROCEDURE — 83540 ASSAY OF IRON: CPT

## 2025-04-10 PROCEDURE — 83036 HEMOGLOBIN GLYCOSYLATED A1C: CPT | Performed by: FAMILY MEDICINE

## 2025-04-10 PROCEDURE — 36415 COLL VENOUS BLD VENIPUNCTURE: CPT

## 2025-04-10 PROCEDURE — 1160F RVW MEDS BY RX/DR IN RCRD: CPT

## 2025-04-10 PROCEDURE — 82728 ASSAY OF FERRITIN: CPT

## 2025-04-10 PROCEDURE — 84165 PROTEIN E-PHORESIS SERUM: CPT

## 2025-04-10 PROCEDURE — 83521 IG LIGHT CHAINS FREE EACH: CPT

## 2025-04-10 PROCEDURE — 86140 C-REACTIVE PROTEIN: CPT

## 2025-04-10 PROCEDURE — 1036F TOBACCO NON-USER: CPT

## 2025-04-10 PROCEDURE — 3077F SYST BP >= 140 MM HG: CPT

## 2025-04-10 PROCEDURE — 82043 UR ALBUMIN QUANTITATIVE: CPT | Performed by: FAMILY MEDICINE

## 2025-04-10 PROCEDURE — 82607 VITAMIN B-12: CPT

## 2025-04-10 PROCEDURE — 3078F DIAST BP <80 MM HG: CPT

## 2025-04-10 PROCEDURE — 1159F MED LIST DOCD IN RCRD: CPT

## 2025-04-10 PROCEDURE — 1126F AMNT PAIN NOTED NONE PRSNT: CPT

## 2025-04-10 PROCEDURE — 82746 ASSAY OF FOLIC ACID SERUM: CPT

## 2025-04-10 PROCEDURE — 80061 LIPID PANEL: CPT | Performed by: FAMILY MEDICINE

## 2025-04-10 PROCEDURE — 85025 COMPLETE CBC W/AUTO DIFF WBC: CPT

## 2025-04-10 PROCEDURE — 84443 ASSAY THYROID STIM HORMONE: CPT | Performed by: FAMILY MEDICINE

## 2025-04-10 RX ORDER — FOLIC ACID 1 MG/1
1 TABLET ORAL DAILY
Qty: 90 TABLET | Refills: 3 | Status: SHIPPED | OUTPATIENT
Start: 2025-04-10

## 2025-04-10 ASSESSMENT — PAIN SCALES - GENERAL: PAINLEVEL_OUTOF10: 0-NO PAIN

## 2025-04-10 NOTE — ASSESSMENT & PLAN NOTE
Patient with COPD and Asthma that is well controlled and stable. Based on asthma severity, patient's asthma is classified as mild intermittent and ICS therapy is recommended. Due to miscommunication between pharmacy and patient, she has not used Qvar since the end of 2024. She is doing fairly well without it with rare albuterol use. Will facilitate refill of Qvar and follow up in 3 months when  PAP renewal is due.    Medication Changes:  CONTINUE  Qvar 80 mcg/act inhaler 1 puff twice daily  Albuterol 0.083% nebulizer 1 vial every 4-6 hours as needed

## 2025-04-10 NOTE — PROGRESS NOTES
Clinical Pharmacy Appointment    Patient ID: Nava Gutierrez is a 84 y.o. female who presents for Asthma and COPD.    Pt is here for Follow Up appointment.     Referring Provider: Terra Gray MD  PCP: Terra Gray MD   Last visit with PCP: 4/2/25   Next visit with PCP: 7/10/25    Subjective   Medication Reconciliation:  Changed: N/A  Added: N/A  Discontinued: Flovent    Drug Interactions  No relevant drug interactions were noted.    Medication System Management  Patient's preferred pharmacy: [facility]  Adherence/Organization: Takes medications as prescribed.  Affordability/Accessibility: Qvar covered by [facility] until 8/20/26. Has not had Qvar filled since November due to miscommunications.    HPI  PULMONARY ASSESSMENT  Patient has been diagnosed with: COPD and Asthma  Does patient see a pulmonologist: No    Current Regimen  Qvar 80 mcg/actuation inhaler 1 puff twice daily  Albuterol 0.083% nebulizer 1 vial every 4-6 hours as needed    Clarifications to above regimen: None   Adverse Effects: None   Appropriate technique? Yes    Historical Treatment  Flovent    Asthma Severity  Symptoms/week: < or = 2 days/week  Primary Symptoms: dyspnea and wheezing  Aggravating Factors: change in weather  Allergies: No  Allergy Control Measures: N/A  Alleviating Factors:  FARRAH use  Nighttime awakenings: < or = 2 times/month   FARRAH use: < or = 2 days/week  Interference with normal activity: none    Office Spirometry Results        Exacerbation History:  When was your last hospitalization for an exacerbation? January 2018  When was the last time you were treated with antibiotics and/or steroids? >1 year ago     Immunization History:  Influenza: 9/19/24  PCV13: 6/17/17  PPSV23: 7/25/14  PCV20: N/A  COVID: 10/18/24  RSV: N/A    Smoking History:  She quit smoking approximately  21  years ago.     Objective   No Known Allergies  Social History     Social History Narrative    Not on file      Medication Review  Current Outpatient  Medications   Medication Instructions    albuterol 2.5 mg /3 mL (0.083 %) nebulizer solution USE 1 UNIT DOSE EVERY 4-6 HOURS AS NEEDED FOR WHEEZING    beclomethasone (Qvar) 80 mcg/actuation inhaler 1 Inhalation, inhalation, 2 times daily RT, Rinse mouth with water after use to reduce aftertaste and incidence of candidiasis. Do not swallow.    CALCIUM ORAL Calcium TABS    cholecalciferol (Vitamin D-3) 50 mcg (2,000 unit) capsule take 1 capsule by mouth once daily    folic acid (FOLVITE) 1 mg, oral, Daily    gabapentin (NEURONTIN) 300 mg, oral, Nightly    latanoprost (Xalatan) 0.005 % ophthalmic solution No dose, route, or frequency recorded.    magnesium oxide 400 mg magnesium capsule TAKE 1 CAPSULE Daily    NIFEdipine ER (ADALAT CC) 90 mg, oral, Daily, DO NOT CRUSH CHEW OR SPLIT    omeprazole (PriLOSEC) 40 mg DR capsule TAKE 1 CAPSULE BY MOUTH DAILY IN THE MORNING BEFORE A MEAL    pravastatin (PRAVACHOL) 20 mg, oral, Daily    primidone (MYSOLINE) 250 mg, oral, 2 times daily    propranolol LA (INDERAL LA) 60 mg, oral, Daily    sodium chloride 1 g, oral, Daily    topiramate (TOPAMAX) 50 mg, oral, 2 times daily    valsartan (DIOVAN) 320 mg, oral, Daily      Vitals  BP Readings from Last 2 Encounters:   04/10/25 154/59   04/02/25 170/70     BMI Readings from Last 1 Encounters:   04/10/25 29.38 kg/m²      Labs  A1C  Lab Results   Component Value Date    HGBA1C 5.8 (H) 04/10/2025    HGBA1C 5.9 (H) 07/08/2024    HGBA1C 5.9 (A) 06/09/2023     BMP  Lab Results   Component Value Date    CALCIUM 9.2 04/10/2025     04/10/2025    K 4.2 04/10/2025    CO2 26 04/10/2025     04/10/2025    BUN 18 04/10/2025    CREATININE 0.82 04/10/2025    EGFR 72 04/10/2025     LFTs  Lab Results   Component Value Date    ALT 13 04/10/2025    AST 17 04/10/2025    ALKPHOS 86 04/10/2025    BILITOT 0.3 04/10/2025     FLP  Lab Results   Component Value Date    TRIG 215 (H) 04/10/2025    CHOL 188 04/10/2025    LDLF 96 06/15/2023    LDLCALC 95  04/10/2025    HDL 50.5 04/10/2025     Urine Microalbumin  Lab Results   Component Value Date    MICROALBCREA 79.1 (H) 04/10/2025     Weight Management  Wt Readings from Last 3 Encounters:   04/10/25 64.9 kg (143 lb)   04/02/25 64.9 kg (143 lb)   02/27/25 63 kg (139 lb)      There is no height or weight on file to calculate BMI.     Assessment/Plan   Problem List Items Addressed This Visit       Asthma     Patient with COPD and Asthma that is well controlled and stable. Based on asthma severity, patient's asthma is classified as mild intermittent and ICS therapy is recommended. Due to miscommunication between pharmacy and patient, she has not used Qvar since the end of 2024. She is doing fairly well without it with rare albuterol use. Will facilitate refill of Qvar and follow up in 3 months when  PAP renewal is due.    Medication Changes:  CONTINUE  Qvar 80 mcg/act inhaler 1 puff twice daily  Albuterol 0.083% nebulizer 1 vial every 4-6 hours as needed         Relevant Medications    beclomethasone (Qvar) 80 mcg/actuation inhaler    Other Relevant Orders    Referral to Clinical Pharmacy    Chronic obstructive pulmonary disease, unspecified COPD type (Multi)       Clinical Pharmacist follow-up: 7/11/25, Telehealth visit  Patient is not followed in [facility].     Continue all meds under the continuation of care with the referring provider and clinical pharmacy team.    Thank you,  Soledad Rowan, PharmD  Clinical Pharmacist  [phone]    Verbal consent to manage patient's drug therapy was obtained from the patient. They were informed they may decline to participate or withdraw from participation in pharmacy services at any time.

## 2025-04-10 NOTE — PROGRESS NOTES
Patient ID: Nava Gutierrez is a 84 y.o. female.  Referring Physician: No referring provider defined for this encounter.  Primary Care Provider: Terra Gray MD  Visit Type: Follow Up     84 y.o. female with a PMH significant for HTN, hyperlipidemia allergic rhinitis, hyponatremia, glaucoma, GERD, alpha that trait, anemia of CKD, anxiety, osteoarthritis, tremor, COPD presents alone for multifactorial anemia follow up.     Patient is transfer from [facility] D[facility] PA-[facility], last saw Bran on 2024. She was previously followed by Christina Leo, MELISSA-CNP     She reports anemia since childhood. She's had longstanding history of microcytic anemia, she was treated with vitamin B12 and iron supplementation without improvement in her anemia. She had workup for this microcytic anemia, and the conclusion is that she likely has alpha thalassemia minor per review of chart, but no records of hemoglobin identification labs from chart. She never had blood transfusions.  Review of labs showed microcytic/hypochromic anemia since 2018, hgb ranging from 9.4-10.6, platelets WNL, WBC WNL with intermittent with lymphocytosis, but asymptomatic.    She denies unintentional weight loss or weight gain, abnormal bruising and bleeding, hematuria, blood in stool, dark/black stools, epistaxis, oral/gingival bleeding, lymphadenopathy, recurrent infections, recurrent fevers, night sweats, early satiety, abdominal pain, bone pain, chest pain, palpitations, SOB, HASTINGS, fatigue, dizziness, lightheadedness, PICA. No joint/body pain. No known blood disorders in family. Has had surgery in past w/o issue. Never had blood/blood products.     PSHx:   Hysterectomy     FHx:  -Mother:  at 106 from unknown medical condition at nursing home   -Father:  at 83 from prostate CA  -Siblings: 2 sisters  -Children: 1 child, has DM  -Miscarriages: 2, 1 early in 2 months, 2nd was at 4 months due to fibroids per patient     Social Hx:  -Occupation: Retired,  nursing assistance  -Marital Status:    -Alcohol Use: Denies   -Smoking: Former smoker, quit many years ago  -Recreational Drug Use: Denies   -Any special diets: Does not eat red meat    Menstrual Cycle History: Hx fibroids, heavy menses     Screenings  -Colonoscopy: 7/2019, diverticula seen. No EGD done  -Mammogram: 3/2025 no mammographic evidence of malignancy.  -PAP smears: Hx hysterectomy  -Lung cancer screenings: None    Review of Systems:  10 point review of systems negative except as state in HPI    Objective   BSA: 1.64 meters squared  /59 (BP Location: Left arm, Patient Position: Sitting, BP Cuff Size: Adult)   Pulse 80   Temp 37.4 °C (99.3 °F) (Temporal)   Resp 17   Wt 64.9 kg (143 lb)   SpO2 97%   BMI 29.38 kg/m²     Family History   Problem Relation Name Age of Onset    Other (Advanced Age) Mother      Tremor Father      Tremor Sister      Breast cancer Sister  40 - 49    Tremor Paternal Grandmother       Physical Exam  Constitutional:       Appearance: Normal appearance.      Comments: Uses a cane   HENT:      Head: Normocephalic.   Eyes:      Comments: Pale conjunctiva    Cardiovascular:      Rate and Rhythm: Normal rate and regular rhythm.   Pulmonary:      Effort: Pulmonary effort is normal.      Breath sounds: Normal breath sounds.   Abdominal:      General: Bowel sounds are normal. There is distension.      Palpations: Abdomen is soft.      Tenderness: There is no abdominal tenderness.   Musculoskeletal:      Cervical back: Normal range of motion.   Lymphadenopathy:      Cervical: No cervical adenopathy.      Right cervical: No superficial cervical adenopathy.     Left cervical: No superficial cervical adenopathy.   Skin:     General: Skin is warm and dry.   Neurological:      General: No focal deficit present.      Mental Status: She is alert and oriented to person, place, and time.      Motor: Tremor present.      Gait: Gait abnormal.   Psychiatric:         Mood and Affect:  "Mood normal.       WBC   Date/Time Value Ref Range Status   04/10/2025 09:48 AM 5.6 4.4 - 11.3 x10*3/uL Final   11/21/2024 09:51 AM 5.9 4.4 - 11.3 x10*3/uL Final   07/18/2024 09:51 AM 4.9 4.4 - 11.3 x10*3/uL Final     nRBC   Date Value Ref Range Status   04/10/2025 0.0 0.0 - 0.0 /100 WBCs Final   11/21/2024 0.0 0.0 - 0.0 /100 WBCs Final   07/18/2024 0.0 0.0 - 0.0 /100 WBCs Final     RBC   Date Value Ref Range Status   04/10/2025 4.60 4.00 - 5.20 x10*6/uL Final   11/21/2024 4.64 4.00 - 5.20 x10*6/uL Final   07/18/2024 4.58 4.00 - 5.20 x10*6/uL Final     Hemoglobin   Date Value Ref Range Status   04/10/2025 9.6 (L) 12.0 - 16.0 g/dL Final   11/21/2024 9.7 (L) 12.0 - 16.0 g/dL Final   07/18/2024 9.4 (L) 12.0 - 16.0 g/dL Final     Hematocrit   Date Value Ref Range Status   04/10/2025 31.8 (L) 36.0 - 46.0 % Final   11/21/2024 31.8 (L) 36.0 - 46.0 % Final   07/18/2024 31.3 (L) 36.0 - 46.0 % Final     MCV   Date/Time Value Ref Range Status   04/10/2025 09:48 AM 69 (L) 80 - 100 fL Final   11/21/2024 09:51 AM 69 (L) 80 - 100 fL Final   07/18/2024 09:51 AM 68 (L) 80 - 100 fL Final     MCH   Date/Time Value Ref Range Status   04/10/2025 09:48 AM 20.9 (L) 26.0 - 34.0 pg Final   11/21/2024 09:51 AM 20.9 (L) 26.0 - 34.0 pg Final   07/18/2024 09:51 AM 20.5 (L) 26.0 - 34.0 pg Final     MCHC   Date/Time Value Ref Range Status   04/10/2025 09:48 AM 30.2 (L) 32.0 - 36.0 g/dL Final   11/21/2024 09:51 AM 30.5 (L) 32.0 - 36.0 g/dL Final   07/18/2024 09:51 AM 30.0 (L) 32.0 - 36.0 g/dL Final     RDW   Date/Time Value Ref Range Status   04/10/2025 09:48 AM 16.3 (H) 11.5 - 14.5 % Final   11/21/2024 09:51 AM 16.3 (H) 11.5 - 14.5 % Final   07/18/2024 09:51 AM 16.7 (H) 11.5 - 14.5 % Final     Platelets   Date/Time Value Ref Range Status   04/10/2025 09:48  150 - 450 x10*3/uL Final   11/21/2024 09:51  150 - 450 x10*3/uL Final   07/18/2024 09:51  150 - 450 x10*3/uL Final     No results found for: \"MPV\"  Neutrophils %   Date/Time " Value Ref Range Status   04/10/2025 09:48 AM 33.1 40.0 - 80.0 % Final   11/21/2024 09:51 AM 35.6 40.0 - 80.0 % Final   07/18/2024 09:51 AM 36.1 40.0 - 80.0 % Final     Immature Granulocytes %, Automated   Date/Time Value Ref Range Status   04/10/2025 09:48 AM 0.0 0.0 - 0.9 % Final     Comment:     Immature Granulocyte Count (IG) includes promyelocytes, myelocytes and metamyelocytes but does not include bands. Percent differential counts (%) should be interpreted in the context of the absolute cell counts (cells/UL).   11/21/2024 09:51 AM 0.2 0.0 - 0.9 % Final     Comment:     Immature Granulocyte Count (IG) includes promyelocytes, myelocytes and metamyelocytes but does not include bands. Percent differential counts (%) should be interpreted in the context of the absolute cell counts (cells/UL).   07/18/2024 09:51 AM 0.2 0.0 - 0.9 % Final     Comment:     Immature Granulocyte Count (IG) includes promyelocytes, myelocytes and metamyelocytes but does not include bands. Percent differential counts (%) should be interpreted in the context of the absolute cell counts (cells/UL).     Lymphocytes %   Date/Time Value Ref Range Status   04/10/2025 09:48 AM 55.2 13.0 - 44.0 % Final   11/21/2024 09:51 AM 54.1 13.0 - 44.0 % Final   07/18/2024 09:51 AM 53.2 13.0 - 44.0 % Final     Monocytes %   Date/Time Value Ref Range Status   04/10/2025 09:48 AM 8.6 2.0 - 10.0 % Final   11/21/2024 09:51 AM 7.4 2.0 - 10.0 % Final   07/18/2024 09:51 AM 7.1 2.0 - 10.0 % Final     Eosinophils %   Date/Time Value Ref Range Status   04/10/2025 09:48 AM 2.2 0.0 - 6.0 % Final   11/21/2024 09:51 AM 1.9 0.0 - 6.0 % Final   07/18/2024 09:51 AM 2.6 0.0 - 6.0 % Final     Basophils %   Date/Time Value Ref Range Status   04/10/2025 09:48 AM 0.9 0.0 - 2.0 % Final   11/21/2024 09:51 AM 0.8 0.0 - 2.0 % Final   07/18/2024 09:51 AM 0.8 0.0 - 2.0 % Final     Neutrophils Absolute   Date/Time Value Ref Range Status   04/10/2025 09:48 AM 1.85 1.60 - 5.50 x10*3/uL  Final     Comment:     Percent differential counts (%) should be interpreted in the context of the absolute cell counts (cells/uL).   11/21/2024 09:51 AM 2.11 1.60 - 5.50 x10*3/uL Final     Comment:     Percent differential counts (%) should be interpreted in the context of the absolute cell counts (cells/uL).   07/18/2024 09:51 AM 1.77 1.60 - 5.50 x10*3/uL Final     Comment:     Percent differential counts (%) should be interpreted in the context of the absolute cell counts (cells/uL).     Immature Granulocytes Absolute, Automated   Date/Time Value Ref Range Status   04/10/2025 09:48 AM 0.00 0.00 - 0.50 x10*3/uL Final   11/21/2024 09:51 AM 0.01 0.00 - 0.50 x10*3/uL Final   07/18/2024 09:51 AM 0.01 0.00 - 0.50 x10*3/uL Final     Lymphocytes Absolute   Date/Time Value Ref Range Status   04/10/2025 09:48 AM 3.08 (H) 0.80 - 3.00 x10*3/uL Final   11/21/2024 09:51 AM 3.20 (H) 0.80 - 3.00 x10*3/uL Final   07/18/2024 09:51 AM 2.61 0.80 - 3.00 x10*3/uL Final     Monocytes Absolute   Date/Time Value Ref Range Status   04/10/2025 09:48 AM 0.48 0.05 - 0.80 x10*3/uL Final   11/21/2024 09:51 AM 0.44 0.05 - 0.80 x10*3/uL Final   07/18/2024 09:51 AM 0.35 0.05 - 0.80 x10*3/uL Final     Eosinophils Absolute   Date/Time Value Ref Range Status   04/10/2025 09:48 AM 0.12 0.00 - 0.40 x10*3/uL Final   11/21/2024 09:51 AM 0.11 0.00 - 0.40 x10*3/uL Final   07/18/2024 09:51 AM 0.13 0.00 - 0.40 x10*3/uL Final     Basophils Absolute   Date/Time Value Ref Range Status   04/10/2025 09:48 AM 0.05 0.00 - 0.10 x10*3/uL Final   11/21/2024 09:51 AM 0.05 0.00 - 0.10 x10*3/uL Final   07/18/2024 09:51 AM 0.04 0.00 - 0.10 x10*3/uL Final     Assessment/Plan    84 y.o. female with a PMH significant for for HTN, hyperlipidemia allergic rhinitis, hyponatremia, glaucoma, GERD, alpha that trait, anemia of CKD, anxiety, osteoarthritis, tremor, COPD presents alone for multifactorial anemia follow up.   Patient is transfer from [facility] PA-[facility], last saw  Bran on 11/2024. She was previously followed by MELISSA Rdz-CNP     # Microcytic anemia/Anemia of CKD  Longstanding history of microcytic anemia, treated with vitamin B12 and iron supplementation without improvement in her anemia. Previous workup for microcytic anemia showed she likely has alpha thalassemia minor per review of chart, but no records of hemoglobin identification labs from chart.    Review of labs shows microcytic/hypochromic anemia since Jan 2018, hgb ranging from 9.4-10.6, platelets WNL, WBC WNL with intermittent with lymphocytosis, but asymptomatic.   Elevated CRP indicating chronic dx/inflammation  B12 and folate WNL-she on oral folic acid daily  EPO inappropriately low for anemia but Hgb stable at 9.6, not indicated to start EPO injection.  Iron panel shows elevated ferritin and low to normal %Tsat since 2018     Plan:  - Recheck CBC/d, CMP, added SPEP, kappa/lambda labs, retic, epo, CRP  - Continue on oral folic acid daily   - Follow up next: 6 months   - Labs prior to follow up:  CBC/d, CMP, B12/folate, iron panel, retics, hemoglobin identification    # Lymphocytosis   WBC WNL with intermittent with lymphocytosis since 11/2018, but asymptomatic.   B12 and folate WNL  No previous viral labs in chart     Plan:  - Added a flow cytometry, viral labs, and RINA labs added  - Follow up next: 6 months  - Labs prior to follow up: Flow cytometry       Diagnoses and all orders for this visit:  Anemia of chronic disease  -     Serum Protein Electrophoresis; Future  -     [place]/Lambda Free Light Chain, Serum; Future  -     C-Reactive Protein; Future  -     Clinic Appointment Request Follow Up; JODY GARCIA; Future  -     CBC and Auto Differential; Future  -     Comprehensive Metabolic Panel; Future  -     Folate; Future  -     Ferritin; Future  -     Vitamin B12; Future  -     Iron and TIBC; Future  -     Reticulocytes; Future  -     folic acid (Folvite) 1 mg tablet; Take 1 tablet (1 mg) by  mouth once daily.  Anemia of chronic renal failure, unspecified CKD stage  -     Serum Protein Electrophoresis; Future  -     [place]/Lambda Free Light Chain, Serum; Future  -     Clinic Appointment Request Follow Up; SOLEDAD GARCIA; Future  -     CBC and Auto Differential; Future  -     Comprehensive Metabolic Panel; Future  -     Folate; Future  -     Ferritin; Future  -     Vitamin B12; Future  -     Iron and TIBC; Future  -     Reticulocytes; Future  -     folic acid (Folvite) 1 mg tablet; Take 1 tablet (1 mg) by mouth once daily.  Microcytic anemia  -     Serum Protein Electrophoresis; Future  -     [place]/Lambda Free Light Chain, Serum; Future  -     Hemoglobin Identification with Path Review; Future  -     Clinic Appointment Request Follow Up; SOLEDAD GARCIA; Future  -     CBC and Auto Differential; Future  -     Comprehensive Metabolic Panel; Future  -     Folate; Future  -     Ferritin; Future  -     Vitamin B12; Future  -     Iron and TIBC; Future  -     Reticulocytes; Future  -     folic acid (Folvite) 1 mg tablet; Take 1 tablet (1 mg) by mouth once daily.  Alpha thalassemia trait  -     Serum Protein Electrophoresis; Future  -     [place]/Lambda Free Light Chain, Serum; Future  -     Clinic Appointment Request Follow Up; SOLEDAD GARCIA; Future  -     CBC and Auto Differential; Future  -     Comprehensive Metabolic Panel; Future  -     Folate; Future  -     Ferritin; Future  -     Vitamin B12; Future  -     Iron and TIBC; Future  -     Reticulocytes; Future  -     folic acid (Folvite) 1 mg tablet; Take 1 tablet (1 mg) by mouth once daily.  Lymphocytosis  -     Flow, Path Review & Reflex Genetics, Blood; Future  -     HIV 1/2 Antigen/Antibody Screen with Reflex to Confirmation; Future  -     EBV Screen (VCA IgG/IgM); Future  -     Hepatitis B Core Antibody, IgM; Future  -     Hepatitis B Surface Antigen; Future  -     Hepatitis C Antibody; Future  -     RINA with Reflex to [place]ES[place]; Future        Soledad  MELISSA Woods-CNP

## 2025-04-11 LAB
ALBUMIN: 4.1 G/DL (ref 3.4–5)
ALPHA 1 GLOBULIN: 0.3 G/DL (ref 0.2–0.6)
ALPHA 2 GLOBULIN: 0.8 G/DL (ref 0.4–1.1)
BETA GLOBULIN: 0.9 G/DL (ref 0.5–1.2)
EPO SERPL-ACNC: 11 MU/ML (ref 4–27)
GAMMA GLOBULIN: 1.3 G/DL (ref 0.5–1.4)
KAPPA LC SERPL-MCNC: 2.5 MG/DL (ref 0.33–1.94)
KAPPA LC/LAMBDA SER: 1.66 {RATIO} (ref 0.26–1.65)
LAMBDA LC SERPL-MCNC: 1.51 MG/DL (ref 0.57–2.63)
PATH REVIEW-SERUM PROTEIN ELECTROPHORESIS: NORMAL
PROTEIN ELECTROPHORESIS COMMENT: NORMAL

## 2025-04-14 ENCOUNTER — PHARMACY VISIT (OUTPATIENT)
Dept: PHARMACY | Facility: CLINIC | Age: 85
End: 2025-04-14
Payer: COMMERCIAL

## 2025-06-09 DIAGNOSIS — E87.1 HYPONATREMIA: ICD-10-CM

## 2025-06-09 RX ORDER — SODIUM CHLORIDE 1000 MG
1 TABLET, SOLUBLE MISCELLANEOUS DAILY
Qty: 90 TABLET | Refills: 1 | Status: SHIPPED | OUTPATIENT
Start: 2025-06-09

## 2025-06-10 DIAGNOSIS — G25.0 BENIGN FAMILIAL TREMOR: ICD-10-CM

## 2025-06-10 DIAGNOSIS — I10 HTN (HYPERTENSION), BENIGN: ICD-10-CM

## 2025-06-10 DIAGNOSIS — E78.00 PURE HYPERCHOLESTEROLEMIA: ICD-10-CM

## 2025-06-10 RX ORDER — NIFEDIPINE 90 MG/1
90 TABLET, EXTENDED RELEASE ORAL DAILY
Qty: 100 TABLET | Refills: 2 | OUTPATIENT
Start: 2025-06-10

## 2025-06-10 RX ORDER — PRAVASTATIN SODIUM 20 MG/1
20 TABLET ORAL DAILY
Qty: 100 TABLET | Refills: 2 | OUTPATIENT
Start: 2025-06-10

## 2025-06-10 RX ORDER — PROPRANOLOL HYDROCHLORIDE 60 MG/1
60 CAPSULE, EXTENDED RELEASE ORAL DAILY
Qty: 100 CAPSULE | Refills: 2 | OUTPATIENT
Start: 2025-06-10

## 2025-06-16 ENCOUNTER — OFFICE VISIT (OUTPATIENT)
Dept: ORTHOPEDIC SURGERY | Facility: HOSPITAL | Age: 85
End: 2025-06-16
Payer: MEDICARE

## 2025-06-16 ENCOUNTER — HOSPITAL ENCOUNTER (OUTPATIENT)
Dept: RADIOLOGY | Facility: EXTERNAL LOCATION | Age: 85
Discharge: HOME | End: 2025-06-16

## 2025-06-16 DIAGNOSIS — M17.12 PRIMARY OSTEOARTHRITIS OF LEFT KNEE: Primary | ICD-10-CM

## 2025-06-16 PROCEDURE — 20611 DRAIN/INJ JOINT/BURSA W/US: CPT | Mod: LT | Performed by: EMERGENCY MEDICINE

## 2025-06-16 PROCEDURE — 99213 OFFICE O/P EST LOW 20 MIN: CPT | Performed by: EMERGENCY MEDICINE

## 2025-06-16 PROCEDURE — 1159F MED LIST DOCD IN RCRD: CPT | Performed by: EMERGENCY MEDICINE

## 2025-06-16 PROCEDURE — 2500000004 HC RX 250 GENERAL PHARMACY W/ HCPCS (ALT 636 FOR OP/ED): Performed by: EMERGENCY MEDICINE

## 2025-06-16 PROCEDURE — 1125F AMNT PAIN NOTED PAIN PRSNT: CPT | Performed by: EMERGENCY MEDICINE

## 2025-06-16 RX ORDER — LIDOCAINE HYDROCHLORIDE 10 MG/ML
4 INJECTION, SOLUTION INFILTRATION; PERINEURAL
Status: COMPLETED | OUTPATIENT
Start: 2025-06-16 | End: 2025-06-16

## 2025-06-16 RX ORDER — TRIAMCINOLONE ACETONIDE 40 MG/ML
80 INJECTION, SUSPENSION INTRA-ARTICULAR; INTRAMUSCULAR
Status: COMPLETED | OUTPATIENT
Start: 2025-06-16 | End: 2025-06-16

## 2025-06-16 RX ADMIN — TRIAMCINOLONE ACETONIDE 80 MG: 200 INJECTION, SUSPENSION INTRA-ARTICULAR; INTRAMUSCULAR at 10:55

## 2025-06-16 RX ADMIN — LIDOCAINE HYDROCHLORIDE 4 ML: 10 INJECTION, SOLUTION INFILTRATION; PERINEURAL at 10:55

## 2025-06-16 ASSESSMENT — PAIN SCALES - GENERAL: PAINLEVEL_OUTOF10: 8

## 2025-06-16 ASSESSMENT — PAIN - FUNCTIONAL ASSESSMENT: PAIN_FUNCTIONAL_ASSESSMENT: 0-10

## 2025-06-16 NOTE — PROGRESS NOTES
Subjective   Patient ID: Nava Gutierrez is a 84 y.o. female who presents for Follow-up of the Left Knee (DOLI: 03/24/2025/).  History of Present Illness  The patient returns today with a complaint of left knee pain. She has known left knee degenerative joint disease (DJD) and received an intra-articular corticosteroid injection on 03/24/2025, which provided relief until recently. However, her pain has progressively worsened, prompting her to seek a repeat injection today. She reports no additional complaints.    The previous injection provided significant relief, but she has been experiencing fluid accumulation in her knee for the past week. She is curious about the cause of this fluid buildup.    All other systems have been reviewed and are negative for complaint.      Objective     There were no vitals taken for this visit.     Physical Exam  - Left knee exhibits a grade 3 effusion  - Flexion and extension are limited  - Right knee shows no signs of effusion      Patient ID: Nava Gutierrez is a 84 y.o. female.    L Inj/Asp: L knee on 6/16/2025 10:55 AM  Indications: pain and joint swelling  Details: 18 G needle, ultrasound-guided superolateral approach  Medications: 80 mg triamcinolone acetonide 40 mg/mL; 4 mL lidocaine 10 mg/mL (1 %)  Aspirate: 36 mL clear and yellow  Outcome: tolerated well, no immediate complications  Procedure, treatment alternatives, risks and benefits explained, specific risks discussed. Consent was given by the patient. Immediately prior to procedure a time out was called to verify the correct patient, procedure, equipment, support staff and site/side marked as required. Patient was prepped and draped in the usual sterile fashion.           1. Primary osteoarthritis of left knee  Point of Care Ultrasound          Assessment & Plan  1. Left knee pain  - Known left knee degenerative joint disease (DJD)  - Previously responded well to intra-articular corticosteroid injection on 03/24/2025  -  Recurrence of pain and fluid buildup in the left knee  - Examination revealed grade 3 effusion in the left knee  - Repeat intra-articular corticosteroid injection administered today  - Approximately 34 mL of fluid aspirated from the left knee  - Advised to avoid submerging the knee in water for 2 days, showers permitted  - Small bruise noted, expected to resolve on its own    PROCEDURE    An intra-articular corticosteroid injection was performed on 03/24/2025.    Today, approximately 34 mL of fluid was aspirated from the left knee.    Benjamin Ponce,          This medical note was created with the assistance of artificial intelligence (AI) for documentation purposes. The content has been reviewed and confirmed by the healthcare provider for accuracy and completeness. Patient consented to the use of audio recording and use of AI during their visit.

## 2025-07-08 DIAGNOSIS — B02.29 POST HERPETIC NEURALGIA: ICD-10-CM

## 2025-07-09 RX ORDER — GABAPENTIN 300 MG/1
300 CAPSULE ORAL NIGHTLY
Qty: 100 CAPSULE | Refills: 0 | OUTPATIENT
Start: 2025-07-09

## 2025-07-10 ENCOUNTER — APPOINTMENT (OUTPATIENT)
Dept: PRIMARY CARE | Facility: CLINIC | Age: 85
End: 2025-07-10
Payer: MEDICARE

## 2025-07-10 VITALS
OXYGEN SATURATION: 95 % | WEIGHT: 145 LBS | BODY MASS INDEX: 29.23 KG/M2 | HEART RATE: 94 BPM | DIASTOLIC BLOOD PRESSURE: 68 MMHG | SYSTOLIC BLOOD PRESSURE: 138 MMHG | HEIGHT: 59 IN

## 2025-07-10 DIAGNOSIS — E78.00 PURE HYPERCHOLESTEROLEMIA: ICD-10-CM

## 2025-07-10 DIAGNOSIS — E87.1 HYPONATREMIA: ICD-10-CM

## 2025-07-10 DIAGNOSIS — M81.0 AGE-RELATED OSTEOPOROSIS WITHOUT CURRENT PATHOLOGICAL FRACTURE: ICD-10-CM

## 2025-07-10 DIAGNOSIS — Z12.31 ENCOUNTER FOR SCREENING MAMMOGRAM FOR BREAST CANCER: ICD-10-CM

## 2025-07-10 DIAGNOSIS — I10 HTN (HYPERTENSION), BENIGN: ICD-10-CM

## 2025-07-10 DIAGNOSIS — G25.0 BENIGN FAMILIAL TREMOR: ICD-10-CM

## 2025-07-10 DIAGNOSIS — J44.9 CHRONIC OBSTRUCTIVE PULMONARY DISEASE, UNSPECIFIED COPD TYPE (MULTI): ICD-10-CM

## 2025-07-10 DIAGNOSIS — J45.20 MILD INTERMITTENT ASTHMA, UNSPECIFIED WHETHER COMPLICATED (HHS-HCC): ICD-10-CM

## 2025-07-10 DIAGNOSIS — K21.9 GASTROESOPHAGEAL REFLUX DISEASE WITHOUT ESOPHAGITIS: ICD-10-CM

## 2025-07-10 DIAGNOSIS — Z00.00 MEDICARE ANNUAL WELLNESS VISIT, SUBSEQUENT: Primary | ICD-10-CM

## 2025-07-10 PROBLEM — K59.00 CONSTIPATION: Status: RESOLVED | Noted: 2023-01-25 | Resolved: 2025-07-10

## 2025-07-10 PROBLEM — E55.9 VITAMIN D DEFICIENCY: Status: RESOLVED | Noted: 2023-01-25 | Resolved: 2025-07-10

## 2025-07-10 PROCEDURE — 99397 PER PM REEVAL EST PAT 65+ YR: CPT | Performed by: FAMILY MEDICINE

## 2025-07-10 PROCEDURE — 1159F MED LIST DOCD IN RCRD: CPT | Performed by: FAMILY MEDICINE

## 2025-07-10 PROCEDURE — G0439 PPPS, SUBSEQ VISIT: HCPCS | Performed by: FAMILY MEDICINE

## 2025-07-10 PROCEDURE — 1170F FXNL STATUS ASSESSED: CPT | Performed by: FAMILY MEDICINE

## 2025-07-10 PROCEDURE — 3078F DIAST BP <80 MM HG: CPT | Performed by: FAMILY MEDICINE

## 2025-07-10 PROCEDURE — 1036F TOBACCO NON-USER: CPT | Performed by: FAMILY MEDICINE

## 2025-07-10 PROCEDURE — 1160F RVW MEDS BY RX/DR IN RCRD: CPT | Performed by: FAMILY MEDICINE

## 2025-07-10 PROCEDURE — 3075F SYST BP GE 130 - 139MM HG: CPT | Performed by: FAMILY MEDICINE

## 2025-07-10 PROCEDURE — 99214 OFFICE O/P EST MOD 30 MIN: CPT | Performed by: FAMILY MEDICINE

## 2025-07-10 RX ORDER — OMEPRAZOLE 40 MG/1
CAPSULE, DELAYED RELEASE ORAL
Qty: 90 CAPSULE | Refills: 1 | Status: SHIPPED | OUTPATIENT
Start: 2025-07-10

## 2025-07-10 RX ORDER — VALSARTAN 320 MG/1
320 TABLET ORAL DAILY
Qty: 90 TABLET | Refills: 1 | Status: SHIPPED | OUTPATIENT
Start: 2025-07-10

## 2025-07-10 RX ORDER — PRAVASTATIN SODIUM 20 MG/1
20 TABLET ORAL DAILY
Qty: 90 TABLET | Refills: 1 | Status: SHIPPED | OUTPATIENT
Start: 2025-07-10

## 2025-07-10 RX ORDER — PRIMIDONE 250 MG/1
250 TABLET ORAL 2 TIMES DAILY
Qty: 180 TABLET | Refills: 1 | Status: SHIPPED | OUTPATIENT
Start: 2025-07-10

## 2025-07-10 RX ORDER — PROPRANOLOL HYDROCHLORIDE 60 MG/1
60 CAPSULE, EXTENDED RELEASE ORAL DAILY
Qty: 90 CAPSULE | Refills: 1 | Status: SHIPPED | OUTPATIENT
Start: 2025-07-10

## 2025-07-10 RX ORDER — SODIUM CHLORIDE 1000 MG
1 TABLET, SOLUBLE MISCELLANEOUS DAILY
Qty: 90 TABLET | Refills: 1 | Status: SHIPPED | OUTPATIENT
Start: 2025-07-10

## 2025-07-10 RX ORDER — NIFEDIPINE 90 MG/1
90 TABLET, EXTENDED RELEASE ORAL DAILY
Qty: 90 TABLET | Refills: 1 | Status: SHIPPED | OUTPATIENT
Start: 2025-07-10

## 2025-07-10 ASSESSMENT — ACTIVITIES OF DAILY LIVING (ADL)
GROCERY_SHOPPING: NEEDS ASSISTANCE
TAKING_MEDICATION: INDEPENDENT
BATHING: INDEPENDENT
DRESSING: INDEPENDENT
MANAGING_FINANCES: NEEDS ASSISTANCE
DOING_HOUSEWORK: NEEDS ASSISTANCE

## 2025-07-10 ASSESSMENT — PATIENT HEALTH QUESTIONNAIRE - PHQ9
SUM OF ALL RESPONSES TO PHQ9 QUESTIONS 1 AND 2: 0
2. FEELING DOWN, DEPRESSED OR HOPELESS: NOT AT ALL
1. LITTLE INTEREST OR PLEASURE IN DOING THINGS: NOT AT ALL

## 2025-07-10 NOTE — PROGRESS NOTES
Subjective   Patient ID: Nava Gutierrez is a 84 y.o. female who presents for Medicare Annual Wellness Visit Subsequent.    Patient Care Team:  Terra Gray MD as PCP - General (Hospitalist)  Terra Gray MD as PCP - [employer] PCP    HPI     The patient reports that she is taking nifedipine and valsartan for hypertension, omeprazole for GERD, pravastatin for hyperlipidemia, gabapentin for post hepatic neuralgia pain, Flovent as well as Albuterol as needed for asthma, sodium tablets for hyponatremia and Primidone, Topamax and propanolol for her essential tremors. She is taking these medications as prescribed and denies having any side effects from them. She saw the hematologist in 11/2024 for anemia. She has seen Dr. Ponce in 6/2025 for left knee osteoarthritis. She lives by herself.      Blood pressure is well controlled at this time.  Denies any falls in the last year. No depression over the last few weeks but felt sad due to her sister's ill health. She eats a well-balanced diet, drinks enough fluids and does not exercise as much.  Denies hearing issues. Her son helps her manage her finances and groceries. She states she is going to [place] this afternoon and is doing well without any complaints or concerns.       Review of Systems  Constitutional: No fever or chills, No Night Sweats  Eyes: No Blurry Vision or Eye sight problems  ENT: No Nasal Discharge, Hoarseness, sore throat  Cardiovascular: no chest pain, no palpitations and no syncope.   Respiratory: no cough, no shortness of breath during exertion and no shortness of breath at rest.   Gastrointestinal: no abdominal pain, no nausea and no vomiting.   : No vaginal discharge, burning with urination, no blood in urine or stools  Skin: No Skin rashes or Lesions  Neuro: No Headache, no dizziness or Numbness or tingling  Psych: No Anxiety, depression + sleeping problems  Heme: No Easy bleeding or brusing.     Objective   /68   Pulse  "94   Ht (!) 1.486 m (4' 10.5\")   Wt 65.8 kg (145 lb)   SpO2 95%   BMI 29.79 kg/m²     Physical Exam  Constitutional: Alert and in no acute distress. Well developed, well nourished.   Head and Face: Head and face: Normal.    Eyes: Normal external exam.   Ears, Nose, Mouth, and Throat: External inspection of ears and nose: Normal.  Hearing: Normal.   Cardiovascular: Heart rate and rhythm were normal, normal S1 and S2. Pedal pulses: Normal. No peripheral edema.   Pulmonary: No respiratory distress. Clear bilateral breath sounds.   Musculoskeletal: No joint swelling seen, normal movements of all extremities. Range of motion: Normal.  Muscle strength/tone: Normal.    Skin: Normal skin color and pigmentation, normal skin turgor, and no rash.   Psychiatric: Judgment and insight: Intact. Mood and affect: Normal.    Lab Results   Component Value Date    WBC 5.6 04/10/2025    HGB 9.6 (L) 04/10/2025    HCT 31.8 (L) 04/10/2025     04/10/2025    CHOL 188 04/10/2025    TRIG 215 (H) 04/10/2025    HDL 50.5 04/10/2025    ALT 13 04/10/2025    AST 17 04/10/2025     04/10/2025    K 4.2 04/10/2025     04/10/2025    CREATININE 0.82 04/10/2025    BUN 18 04/10/2025    CO2 26 04/10/2025    TSH 3.81 04/10/2025    INR 1.1 01/31/2018    HGBA1C 5.8 (H) 04/10/2025       Point of Care Ultrasound  These images are not reportable by radiology and will not be interpreted   by  Radiologists.      Assessment/Plan   Diagnoses and all orders for this visit:  Medicare annual wellness visit, subsequent  -     1 Year Follow Up In [facility] - PCP - Wellness Exam; Future  HTN (hypertension), benign  -     Follow Up In [facility] - PCP - Medicare Annual  -     NIFEdipine ER (NIFEdipine XL) 90 mg 24 hr tablet; Take 1 tablet (90 mg) by mouth once daily. DO NOT CRUSH CHEW OR SPLIT  -     valsartan (Diovan) 320 mg tablet; Take 1 tablet (320 mg) by mouth once daily.  -     Follow Up In [facility] - PCP - " Established; Future  Chronic obstructive pulmonary disease, unspecified COPD type (Multi)  -     beclomethasone (Qvar) 80 mcg/actuation inhaler; Inhale 1 Inhalation 2 times a day. Rinse mouth with water after use to reduce aftertaste and incidence of candidiasis. Do not swallow.  Benign familial tremor  -     primidone (Mysoline) 250 mg tablet; Take 1 tablet (250 mg) by mouth 2 times a day.  -     propranolol LA (Inderal LA) 60 mg 24 hr capsule; Take 1 capsule (60 mg) by mouth once daily.  Pure hypercholesterolemia  -     pravastatin (Pravachol) 20 mg tablet; Take 1 tablet (20 mg) by mouth once daily.  Age-related osteoporosis without current pathological fracture  Gastroesophageal reflux disease without esophagitis  -     omeprazole (PriLOSEC) 40 mg DR capsule; TAKE 1 CAPSULE BY MOUTH DAILY IN THE MORNING BEFORE A MEAL  Mild intermittent asthma, unspecified whether complicated (HHS-HCC)  -     beclomethasone (Qvar) 80 mcg/actuation inhaler; Inhale 1 Inhalation 2 times a day. Rinse mouth with water after use to reduce aftertaste and incidence of candidiasis. Do not swallow.  Hyponatremia  -     sodium chloride 1,000 mg tablet; Take 1 tablet (1 g) by mouth once daily.  Encounter for screening mammogram for breast cancer  -     BI mammo bilateral screening tomosynthesis; Future        Dear Nava Gutierrez     It was my pleasure to take care of you today in the office. Below are the things we discussed today:    1. Immunizations: Yearly Flu shot is recommended.  Up-to-date          a: COVID: Booster up-to-date         b: Tetanus: Please get from the pharmacy          c: Shingrix: Please get from the pharmacy         d: Prevnar: Up-to-date         e. RSV: Please get from the pharmacy in fall     2. Blood Work: Reviewed  3. Seen your dentist twice a year  4. Yearly Eye exam is recommended     5. BMI: Overweight   6: Diet recommendations:   Eat Clean, Try to have as many home cooked meals as possible  Avoid processed foods  which contain excess calories, sugar, and sodium.     7. Exercise recommendations:   150 minutes a week to maintain your weight      If you have to lose weight, you need a better diet and exercise plan.      8. Supplements recommended:  a - Calcium 600 mg up to twice a day to get a total of 1200 mg. Each 8 oz of milk or yogurt or 1 oz of cheese, 1 Banana, 1 serving of green Leafy vegetable has about 300 mg of Calcium, so you may subtract that amount. Calcium citrate is the only acceptable supplement to take if you take an acid suppressing medication like Prilosec; otherwise Calcium carbonate is acceptable too (It can cause Constipation).   b - Vitamin D - 2000 IU daily      9. Please get your Living will / Advance directive completed if you do not have one already. Please make sure our office has a copy of the latest one.      10. Colonoscopy: Uptodate. Last done in July 2019. No repeats recommended   11. Mammogram: Uptodate. Ordered for Feb 2026   12. PAP: Not indicated   13. DEXA: Done Sept 2024  14: Skin Check: Please see Dermatology once a year for a Skin Check.      15. Hypertension: Continue nifedipine and valsartan.     16. GERD: Continue omeprazole.      17. Hyperlipidemia: Continue pravastatin.     18. Post hepatic neuralgia pain: The patient takes gabapentin.     19. Hyponatremia: Continue sodium tablets.     20. Asthma: She is using Qvar inhaler. Continue Albuterol as needed.     21. Essential tremors: Tremors little bit better.  Continue Primidone, Topamax and Propanolol.     22. Anemia: Follow up with Hematology.       Follow up in 4 months.       Follow up in one year for a Physical. Please call the office before your Physical to see if you need blood work completed prior to your physical.     Please call me if any questions arise from now until your next visit. I will call you after I am done seeing patients. A Doctor is always available by phone when the office is closed. Please feel free to call for  help with any problem that you feel shouldn't wait until the office re-opens.     I, Terra Gray MD, attest that this note for 7/10/2025 accurately reflects documentation that my scribe Tomy Murguia, made at my direction in my capacity as Terra Gray MD when I treated Nava Gutierrez.    Scribe Attestation  By signing my name below, I, Tomy Murguia, Scribe   attest that this documentation has been prepared under the direction and in the presence of Terra Gray MD.

## 2025-07-10 NOTE — PROGRESS NOTES
Clinical Pharmacy Appointment    Patient ID: Nava Gutierrez is a 84 y.o. female who presents for Asthma and COPD.    Pt is here for Follow Up appointment.     Referring Provider: Terra Gray MD  PCP: Terra Gray MD   Last visit with PCP: 7/10/25   Next visit with PCP: 1/14/25    Subjective   Medication Reconciliation:  Changed: N/A  Added: N/A  Discontinued: N/A    Drug Interactions  No relevant drug interactions were noted.    Medication System Management  Patient's preferred pharmacy: [facility]  Adherence/Organization: Takes medications as prescribed.  Affordability/Accessibility: Qvar covered by  PAP until 8/20/26.     HPI  PULMONARY ASSESSMENT  Patient has been diagnosed with: COPD and Asthma  Does patient see a pulmonologist: No    Current Regimen  Qvar 80 mcg/actuation inhaler 1 puff twice daily  Albuterol 0.083% nebulizer 1 vial every 4-6 hours as needed    Clarifications to above regimen: None   Adverse Effects: None   Appropriate technique? Yes    Historical Treatment  Flovent    Asthma Severity  Symptoms/week: < or = 2 days/week  Primary Symptoms: dyspnea and wheezing  Aggravating Factors: change in weather  Allergies: No  Allergy Control Measures: N/A  Alleviating Factors: FARRAH use  Nighttime awakenings: < or = 2 times/month   FARRAH use: < or = 2 days/week  Interference with normal activity: none    Office Spirometry Results        Exacerbation History:  When was your last hospitalization for an exacerbation? January 2018  When was the last time you were treated with antibiotics and/or steroids? >1 year ago     Immunization History:  Influenza: 9/19/24  PCV13: 6/17/17  PPSV23: 7/25/14  PCV20: N/A  COVID: 10/18/24  RSV: N/A    Smoking History:  She quit smoking approximately 21 years ago.      Patient Assistance Program (PAP) - RENEWAL     Per regulation, patient is due for their annual renewal for their  PAP application. Patient's application is due for renewal by 8/20/26. Patient understands  that if proper documentation is not received before renewal date, they will no longer be able to receive approved medication(s) free of charge.     Application for program to be submitted for the following medications: Qvar    County of Permanent Address: [place]   Prescription Insurance:  Yes   Members of Household: 1   Files Taxes: No     Patient verbally reports monthly or yearly income which is less than 400% federal poverty level    Patient aware this process may take up to 2 weeks.     If approved medication must be filled through [facility] and MEDICATION WILL BE MAILED TO PATIENT.    Objective   No Known Allergies  Social History     Social History Narrative    Not on file      Medication Review  Current Outpatient Medications   Medication Instructions    albuterol 2.5 mg /3 mL (0.083 %) nebulizer solution USE 1 UNIT DOSE EVERY 4-6 HOURS AS NEEDED FOR WHEEZING    beclomethasone (Qvar) 80 mcg/actuation inhaler 1 Inhalation, inhalation, 2 times daily RT, Rinse mouth with water after use to reduce aftertaste and incidence of candidiasis. Do not swallow.    CALCIUM ORAL Calcium TABS    cholecalciferol (Vitamin D-3) 50 mcg (2,000 unit) capsule take 1 capsule by mouth once daily    folic acid (FOLVITE) 1 mg, oral, Daily    gabapentin (NEURONTIN) 300 mg, oral, Nightly    latanoprost (Xalatan) 0.005 % ophthalmic solution No dose, route, or frequency recorded.    magnesium oxide 400 mg magnesium capsule TAKE 1 CAPSULE Daily    NIFEdipine ER (ADALAT CC) 90 mg, oral, Daily, DO NOT CRUSH CHEW OR SPLIT    omeprazole (PriLOSEC) 40 mg DR capsule TAKE 1 CAPSULE BY MOUTH DAILY IN THE MORNING BEFORE A MEAL    pravastatin (PRAVACHOL) 20 mg, oral, Daily    primidone (MYSOLINE) 250 mg, oral, 2 times daily    propranolol LA (INDERAL LA) 60 mg, oral, Daily    sodium chloride 1 g, oral, Daily    topiramate (TOPAMAX) 50 mg, oral, 2 times daily    valsartan (DIOVAN) 320 mg, oral, Daily      Vitals  BP Readings from Last 2  Encounters:   07/10/25 138/68   04/10/25 154/59     BMI Readings from Last 1 Encounters:   07/10/25 29.79 kg/m²      Labs  A1C  Lab Results   Component Value Date    HGBA1C 5.8 (H) 04/10/2025    HGBA1C 5.9 (H) 07/08/2024    HGBA1C 5.9 (A) 06/09/2023     BMP  Lab Results   Component Value Date    CALCIUM 9.2 04/10/2025     04/10/2025    K 4.2 04/10/2025    CO2 26 04/10/2025     04/10/2025    BUN 18 04/10/2025    CREATININE 0.82 04/10/2025    EGFR 72 04/10/2025     LFTs  Lab Results   Component Value Date    ALT 13 04/10/2025    AST 17 04/10/2025    ALKPHOS 86 04/10/2025    BILITOT 0.3 04/10/2025     FLP  Lab Results   Component Value Date    TRIG 215 (H) 04/10/2025    CHOL 188 04/10/2025    LDLF 96 06/15/2023    LDLCALC 95 04/10/2025    HDL 50.5 04/10/2025     Urine Microalbumin  Lab Results   Component Value Date    MICROALBCREA 79.1 (H) 04/10/2025     Weight Management  Wt Readings from Last 3 Encounters:   07/10/25 65.8 kg (145 lb)   04/10/25 64.9 kg (143 lb)   04/02/25 64.9 kg (143 lb)      There is no height or weight on file to calculate BMI.     Assessment/Plan   Problem List Items Addressed This Visit       Asthma    Patient with COPD and Asthma that is well controlled and stable. Based on asthma severity, patient's asthma is classified as mild intermittent and ICS therapy is recommended. She has been consistently using Qvar for the past 3 months with controlled respiratory symptoms. Has been avoiding heat as much as possible and using air conditioning. Waiting for financial paperwork to submit  PAP renewal application. Discussed recommended vaccines including Shingrix and RSV.  Follow up in 3 months.    Medication Changes:  CONTINUE  Qvar 80 mcg/act inhaler 1 puff twice daily  Albuterol 0.083% nebulizer 1 vial every 4-6 hours as needed         Relevant Medications    beclomethasone (Qvar) 80 mcg/actuation inhaler    Other Relevant Orders    Referral to Clinical Pharmacy    Chronic obstructive  pulmonary disease, unspecified COPD type (Multi)    Relevant Medications    beclomethasone (Qvar) 80 mcg/actuation inhaler     Clinical Pharmacist follow-up: 10/10/25, Telehealth visit  Patient is not followed in CCM.     Continue all meds under the continuation of care with the referring provider and clinical pharmacy team.    Thank you,  Soledad Rowan, PharmD  Clinical Pharmacist  [phone]    Verbal consent to manage patient's drug therapy was obtained from the patient. They were informed they may decline to participate or withdraw from participation in pharmacy services at any time.

## 2025-07-10 NOTE — PATIENT INSTRUCTIONS
Immunizations you need to get from the pharmacy: Tdap, Shingrix, RSV    Scheduling Radiology tests: [phone]    If you need labs done, please go to any  lab, no paperwork needed. If a Lipid panel is ordered, you will need to fast overnight, other blood work does not require fasting.   Lab in this building is in [address] (M-F 7:30-5:00pm and Sat 8-12pm)    Please call me if any questions arise from now until your next visit. I will call you after I am done seeing patients. A Doctor is always available by phone when the office is closed. Please feel free to call for help with any problem that you feel shouldn't wait until the office re-opens.

## 2025-07-11 ENCOUNTER — APPOINTMENT (OUTPATIENT)
Dept: PHARMACY | Facility: HOSPITAL | Age: 85
End: 2025-07-11
Payer: MEDICARE

## 2025-07-11 DIAGNOSIS — J45.20 MILD INTERMITTENT ASTHMA, UNSPECIFIED WHETHER COMPLICATED (HHS-HCC): ICD-10-CM

## 2025-07-11 DIAGNOSIS — J44.9 CHRONIC OBSTRUCTIVE PULMONARY DISEASE, UNSPECIFIED COPD TYPE (MULTI): ICD-10-CM

## 2025-07-11 PROCEDURE — RXMED WILLOW AMBULATORY MEDICATION CHARGE

## 2025-07-11 NOTE — ASSESSMENT & PLAN NOTE
Patient with COPD and Asthma that is well controlled and stable. Based on asthma severity, patient's asthma is classified as mild intermittent and ICS therapy is recommended. She has been consistently using Qvar for the past 3 months with controlled respiratory symptoms. Has been avoiding heat as much as possible and using air conditioning. Waiting for financial paperwork to submit  PAP renewal application. Discussed recommended vaccines including Shingrix and RSV.  Follow up in 3 months.    Medication Changes:  CONTINUE  Qvar 80 mcg/act inhaler 1 puff twice daily  Albuterol 0.083% nebulizer 1 vial every 4-6 hours as needed

## 2025-07-17 ENCOUNTER — PHARMACY VISIT (OUTPATIENT)
Dept: PHARMACY | Facility: CLINIC | Age: 85
End: 2025-07-17
Payer: COMMERCIAL

## 2025-10-10 ENCOUNTER — APPOINTMENT (OUTPATIENT)
Dept: PHARMACY | Facility: HOSPITAL | Age: 85
End: 2025-10-10
Payer: MEDICARE

## 2025-12-16 ENCOUNTER — APPOINTMENT (OUTPATIENT)
Dept: NEUROLOGY | Facility: CLINIC | Age: 85
End: 2025-12-16
Payer: MEDICARE

## 2026-01-14 ENCOUNTER — APPOINTMENT (OUTPATIENT)
Dept: PRIMARY CARE | Facility: CLINIC | Age: 86
End: 2026-01-14
Payer: MEDICARE

## 2026-07-16 ENCOUNTER — APPOINTMENT (OUTPATIENT)
Dept: PRIMARY CARE | Facility: CLINIC | Age: 86
End: 2026-07-16
Payer: MEDICARE